# Patient Record
Sex: FEMALE | Race: WHITE | Employment: FULL TIME | URBAN - METROPOLITAN AREA
[De-identification: names, ages, dates, MRNs, and addresses within clinical notes are randomized per-mention and may not be internally consistent; named-entity substitution may affect disease eponyms.]

---

## 2019-02-01 ENCOUNTER — OFFICE VISIT (OUTPATIENT)
Dept: FAMILY MEDICINE CLINIC | Age: 39
End: 2019-02-01

## 2019-02-01 VITALS
WEIGHT: 132 LBS | SYSTOLIC BLOOD PRESSURE: 103 MMHG | HEART RATE: 59 BPM | BODY MASS INDEX: 20.72 KG/M2 | DIASTOLIC BLOOD PRESSURE: 66 MMHG | OXYGEN SATURATION: 100 % | HEIGHT: 67 IN | RESPIRATION RATE: 16 BRPM | TEMPERATURE: 98.5 F

## 2019-02-01 DIAGNOSIS — G89.29 CHRONIC LEFT-SIDED LOW BACK PAIN WITHOUT SCIATICA: Primary | ICD-10-CM

## 2019-02-01 DIAGNOSIS — M54.50 CHRONIC LEFT-SIDED LOW BACK PAIN WITHOUT SCIATICA: Primary | ICD-10-CM

## 2019-02-01 RX ORDER — DEXAMETHASONE SODIUM PHOSPHATE 4 MG/ML
4 INJECTION, SOLUTION INTRA-ARTICULAR; INTRALESIONAL; INTRAMUSCULAR; INTRAVENOUS; SOFT TISSUE ONCE
Qty: 1 ML | Refills: 0
Start: 2019-02-01 | End: 2019-02-01

## 2019-02-01 RX ORDER — LIDOCAINE HYDROCHLORIDE 10 MG/ML
2 INJECTION INFILTRATION; PERINEURAL ONCE
Qty: 2 ML | Refills: 0
Start: 2019-02-01 | End: 2019-02-01

## 2019-02-01 RX ORDER — NAPROXEN SODIUM 220 MG
220 TABLET ORAL 2 TIMES DAILY WITH MEALS
COMMUNITY
End: 2020-01-14

## 2019-02-01 NOTE — PROGRESS NOTES
Subjective:  
History: This patient is a 43 y.o. female with a chief complaint of low back pain. Left lower back pain x 3 months. No known injury or trauma. She works as a  so bending over a lot and this increases her pain. Aggravating factors: prolonged immobility followed by activity, trouble with bending Alleviating factors: some relief with laying flat Tried chiropractor, home strengthening exercises, ice, back brace, aleeve, tylenol with minimal improvement. She had radiographs done at her chiropractor and she brings the films for review. No bowel or bladder incontinence. No fever, night sweats, or weight changes. No saddle anesthesia. Review of Systems: 
General/Constitutional:  No fever, chills, sweats, fatigue, night sweats, weakness, weight loss or weight gain Head: No headache, no trauma Neck: No swelling, masses, stiffness, pain, or limited movement Cardiac: No chest pain Respiratory: No cough, shortness of breath, or dyspnea on exertion GI: No incontinence. No nausea/vomiting, diarrhea, abdominal pain, bloody or dark stools : No incontinence. No change in urinary habits. Peripheral Vascular: No edema, coldness, numbness, discoloration, pain, or paresthesias Musculoskeletal: As per HPI Neurological: No saddle distribution paresthesia. No siatic pain. No loss of consciousness, dizziness, seizures, dysarthria, cognitive changes, problems with balance, or unilateral weakness. No past medical history on file. Family History Problem Relation Age of Onset  Breast Cancer Paternal Aunt 46  
     breast cancer Current Outpatient Medications Medication Sig Dispense Refill  naproxen sodium (ALEVE) 220 mg tablet Take 220 mg by mouth two (2) times daily (with meals).  dexamethasone (DECADRON) 4 mg/mL injection 1 mL by IntraMUSCular route once for 1 dose.  0.5ml of dexamethasone with 2.5ml of lidocaine for trigger point injection in lower back 1 mL 0  
  lidocaine (XYLOCAINE) 10 mg/mL (1 %) injection 2 mL by IntraMUSCular route once for 1 dose. 0.5ml of dexamethasone with 2.5ml of lidocaine for trigger point injection in lower back 2 mL 0  
 MULTIVITAMIN PO Take  by mouth.  VITAMIN B COMPLEX (B-COMPLEX PO) Take  by mouth.  LORATADINE (CLARITIN PO) Take  by mouth. Allergies Allergen Reactions  Codeine Other (comments)  
  migraines Social History Socioeconomic History  Marital status:  Spouse name: Not on file  Number of children: Not on file  Years of education: Not on file  Highest education level: Not on file Social Needs  Financial resource strain: Not on file  Food insecurity - worry: Not on file  Food insecurity - inability: Not on file  Transportation needs - medical: Not on file  Transportation needs - non-medical: Not on file Occupational History  Not on file Tobacco Use  Smoking status: Never Smoker  Smokeless tobacco: Never Used Substance and Sexual Activity  Alcohol use: No  
 Drug use: Not on file  Sexual activity: Not on file Other Topics Concern  Not on file Social History Narrative Works cleaning houses Objective:  
 
Visit Vitals /66 Pulse (!) 59 Temp 98.5 °F (36.9 °C) (Oral) Resp 16 Ht 5' 7\" (1.702 m) Wt 132 lb (59.9 kg) SpO2 100% BMI 20.67 kg/m² General: Alert and oriented and in no acute distress. Responds to all questions appropriately. LUNGS: Respirations unlabored. Skin: No obvious rash. MSK:  
 Posture: Normal 
 Deformity: None ROM:  
  Flexion: Normal; pain in the left lower back with flexion. Extension: Normal  
  Lateral bending: Normal  
 
 Gait: Normal   
 
 Palpation: L1-L5: No tenderness Sacrum: No tenderness Coccyx: No tenderness Left Paraspinal: tenderness Right Paraspinal: No tenderness Strength (0-5/5)   Hip Flexion:   Left: 5/5  Right: 5/5 
 Hip Extension:  Left: 5/5  Right: 5/5 Hip Abduction:  Left: 5/5  Right: 5/5 Hip Adduction:  Left: 5/5  Right: 5/5 Knee Extension:  Left: 5/5  Right: 5/5 Knee Flexion:   Left: 5/5  Right: 5/5 Ankle dorsiflexion:  Left: 5/5  Right: 5/5 Ankle plantarflexion:  Left: 5/5  Right: 5/5 Great toe extension:  Left: 5/5  Right: 5/5 Sensation: Intact, no deficits DTR: 
  Patella:  Left: +2  Right: +2 Special test: 
  Straight leg: Left: Negative  Right: Negative Rubens: Left: Positive   Right: Negative Piriformis: Left: Negative  Right: Negative Imagin view lumbar spine films from outside facility personally reviewed and demonstrates no fracture, deformity. No significant degenerative changes. Procedure: 
Informed consent - Risks, benefits and alternatives discussed. Time Out - Performed, cross checking patient ID and procedure. Preparation - Cleaned and prepped with alcohol swab x3. Anesthesia - Ethyl chloride spray used to anesthitise the skin prior to injection. Description of procedure - 1 trigger point identified in the bilateral lower back in the paraspinals and injected with 0.5 ml dexamethasone and 2.5 ml of 1% lidocaine mixture under sterile conditions. Patient tolerated the procedure well and there were no complications. Patient reports pain relief following the injections. Assessment: ICD-10-CM ICD-9-CM 1. Chronic left-sided low back pain without sciatica M54.5 724.2 DEXAMETHASONE SODIUM PHOSPHATE INJECTION 1 MG  
 G89.29 338.29 dexamethasone (DECADRON) 4 mg/mL injection [place] THER/PROPH/DIAG INJECTION, SUBCUT/IM  
   LIDOCAINE INJECTION  
   lidocaine (XYLOCAINE) 10 mg/mL (1 %) injection Plan: 1. Home Exercise Program as per handout. 2. Ice 15 minutes, three times a day PRN and after exercise. Can alternate with heat for 15 minutes. 3. Trigger point injection today-orders as above. Medications: 1.  Naproxin (Aleve): 220mg 1-2 tablets twice a day PRN. 2. Acetaminophen (Tylenol):  500mg 1-2 tablets every 6 hours as needed for pain. RTC: patient has wellness exam coming up next week and will f/u of sx at that time.

## 2019-02-01 NOTE — PROGRESS NOTES
Chief Complaint Patient presents with  Back Pain  
  pt describes pain in low back that feels like sciatic pain on left side x 3 months, denies any injury seen by chiropractor for neck & back pain, no relief wirh aleve but min. relief with [facility]ICE PROCEDURE PROGRESS NOTE Chart reviewed for the following: 
 I,Dr. Shana Milligan, have reviewed the History, Physical and updated the Allergic reactions for Christine CORDOVA OUT performed immediately prior to start of procedure: 
 I, Dr. Shana Milligan, have performed the following reviews on Christine Turner prior to the start of the procedure: 
         
* Patient was identified by name and date of birth * Agreement on procedure being performed was verified * Risks and Benefits explained to the patient * Procedure site verified and marked as necessary * Patient was positioned for comfort * Consent was signed and verified Time: 2:05pm 
 
 
Date of procedure: 2/1/2019 Procedure performed by:  Richi Urrutia MD 
 
Provider assisted by: Lupis Burgess MD 
 
Patient assisted by: self How tolerated by patient: tolerated the procedure well with no complications Post Procedural Pain Scale: 2 - Hurts Little Bit Comments: none

## 2019-02-03 ENCOUNTER — TELEPHONE (OUTPATIENT)
Dept: FAMILY MEDICINE CLINIC | Age: 39
End: 2019-02-03

## 2019-02-03 NOTE — TELEPHONE ENCOUNTER
Pt called stating she  had a steroid injection in her back on Friday 2/1/19. She got some relief on Saturday 2/2/19 but today the pain is worse than it was before the injection. Offered appt today but she declined. No fever or redness at injection site. She decided to rest today and make appt for tomorrow. Appt made. Instructed her to take Aleve and use ice for the pain.

## 2019-02-04 ENCOUNTER — TELEPHONE (OUTPATIENT)
Dept: FAMILY MEDICINE CLINIC | Age: 39
End: 2019-02-04

## 2019-02-04 NOTE — TELEPHONE ENCOUNTER
Pt states she is feeling better , was in a lot of pain on yesterday. Today better and currently at work. Per Dr. Gina Cameron notify pt that he can injection SI joint if needed .  Continue exercises

## 2019-02-07 ENCOUNTER — TELEPHONE (OUTPATIENT)
Dept: FAMILY MEDICINE CLINIC | Age: 39
End: 2019-02-07

## 2019-02-07 NOTE — TELEPHONE ENCOUNTER
Pt notified Dr. Hector Navas would like to see her in clinic tomorrow.  Pt states she has appt at 2:30 for complete physical and Dr. Hector Navas will be precepting and states he will come in to see her with Dr. Kelle Mueller  ( Dr. Shauna Wolfe and [facility] aware normal...

## 2019-02-07 NOTE — TELEPHONE ENCOUNTER
----- Message from Courtney Avendano sent at 2/7/2019  8:33 AM EST -----  Regarding: Dr. Alexandria Mata  Patient is extreme back pain after coming in and receiving a steroid injection. Please call her at [phone] and advise of the next steps.

## 2019-02-08 ENCOUNTER — OFFICE VISIT (OUTPATIENT)
Dept: FAMILY MEDICINE CLINIC | Age: 39
End: 2019-02-08

## 2019-02-08 VITALS
HEART RATE: 69 BPM | BODY MASS INDEX: 20.5 KG/M2 | DIASTOLIC BLOOD PRESSURE: 71 MMHG | HEIGHT: 67 IN | WEIGHT: 130.6 LBS | OXYGEN SATURATION: 99 % | RESPIRATION RATE: 16 BRPM | TEMPERATURE: 97 F | SYSTOLIC BLOOD PRESSURE: 102 MMHG

## 2019-02-08 DIAGNOSIS — M54.50 CHRONIC LEFT-SIDED LOW BACK PAIN WITHOUT SCIATICA: ICD-10-CM

## 2019-02-08 DIAGNOSIS — G89.29 CHRONIC LEFT-SIDED LOW BACK PAIN WITHOUT SCIATICA: ICD-10-CM

## 2019-02-08 DIAGNOSIS — Z00.00 WELL ADULT EXAM: Primary | ICD-10-CM

## 2019-02-08 RX ORDER — CYCLOBENZAPRINE HCL 5 MG
5 TABLET ORAL
Qty: 15 TAB | Refills: 0 | Status: SHIPPED | OUTPATIENT
Start: 2019-02-08 | End: 2019-02-20 | Stop reason: SDUPTHER

## 2019-02-09 LAB
ALBUMIN SERPL-MCNC: 4.6 G/DL (ref 3.5–5.5)
ALBUMIN/GLOB SERPL: 1.7 {RATIO} (ref 1.2–2.2)
ALP SERPL-CCNC: 54 IU/L (ref 39–117)
ALT SERPL-CCNC: 10 IU/L (ref 0–32)
AST SERPL-CCNC: 17 IU/L (ref 0–40)
BILIRUB SERPL-MCNC: 0.2 MG/DL (ref 0–1.2)
BUN SERPL-MCNC: 15 MG/DL (ref 6–20)
BUN/CREAT SERPL: 23 (ref 9–23)
CALCIUM SERPL-MCNC: 9.3 MG/DL (ref 8.7–10.2)
CHLORIDE SERPL-SCNC: 104 MMOL/L (ref 96–106)
CHOLEST SERPL-MCNC: 158 MG/DL (ref 100–199)
CO2 SERPL-SCNC: 26 MMOL/L (ref 20–29)
CREAT SERPL-MCNC: 0.66 MG/DL (ref 0.57–1)
ERYTHROCYTE [DISTWIDTH] IN BLOOD BY AUTOMATED COUNT: 12.8 % (ref 12.3–15.4)
GLOBULIN SER CALC-MCNC: 2.7 G/DL (ref 1.5–4.5)
GLUCOSE SERPL-MCNC: 105 MG/DL (ref 65–99)
HCT VFR BLD AUTO: 37.8 % (ref 34–46.6)
HDLC SERPL-MCNC: 53 MG/DL
HGB BLD-MCNC: 12 G/DL (ref 11.1–15.9)
INTERPRETATION, 910389: NORMAL
LDLC SERPL CALC-MCNC: 92 MG/DL (ref 0–99)
MCH RBC QN AUTO: 29.1 PG (ref 26.6–33)
MCHC RBC AUTO-ENTMCNC: 31.7 G/DL (ref 31.5–35.7)
MCV RBC AUTO: 92 FL (ref 79–97)
PLATELET # BLD AUTO: 339 X10E3/UL (ref 150–379)
POTASSIUM SERPL-SCNC: 4.5 MMOL/L (ref 3.5–5.2)
PROT SERPL-MCNC: 7.3 G/DL (ref 6–8.5)
RBC # BLD AUTO: 4.13 X10E6/UL (ref 3.77–5.28)
SODIUM SERPL-SCNC: 144 MMOL/L (ref 134–144)
TRIGL SERPL-MCNC: 66 MG/DL (ref 0–149)
VLDLC SERPL CALC-MCNC: 13 MG/DL (ref 5–40)
WBC # BLD AUTO: 5.9 X10E3/UL (ref 3.4–10.8)

## 2019-02-19 ENCOUNTER — TELEPHONE (OUTPATIENT)
Dept: FAMILY MEDICINE CLINIC | Age: 39
End: 2019-02-19

## 2019-02-19 DIAGNOSIS — G89.29 CHRONIC LEFT-SIDED LOW BACK PAIN WITHOUT SCIATICA: ICD-10-CM

## 2019-02-19 DIAGNOSIS — M54.50 CHRONIC LEFT-SIDED LOW BACK PAIN WITHOUT SCIATICA: ICD-10-CM

## 2019-02-19 NOTE — TELEPHONE ENCOUNTER
Patient calling to office today with concerns of her low back pain. Patient states she doesn't feel like physical therapy should be the 1st option for her at this point. Patient states nobody knows what's wrong with her and she wants to know what's going on with her back. Patient states she feels like a MRI Should be done or some other type of imaging. She states the steroid injection that was given to her set her back as she had terrible side effects. Patient looking for more input on the matter and just feel there should be some other options.     Pt ph [phone]

## 2019-02-20 RX ORDER — CYCLOBENZAPRINE HCL 5 MG
5 TABLET ORAL
Qty: 15 TAB | Refills: 0 | Status: SHIPPED | OUTPATIENT
Start: 2019-02-20 | End: 2020-01-14

## 2019-02-20 NOTE — TELEPHONE ENCOUNTER
Called and discussed with patient. Her concern is that the PT will cause her muscle spasms to be worse, and that she's alomost out of flexeril. Had a detailed conversation regarding her symptoms, the muscular nature of this, and the general timeline for how low back pain improves. Patient reports an understanding, and is comfortable with doing PT prior to pursing advanced injury. Will refill her flexeril which she is only taking at night. She will let us know if any further issues.

## 2019-02-25 ENCOUNTER — HOSPITAL ENCOUNTER (OUTPATIENT)
Dept: PHYSICAL THERAPY | Age: 39
Discharge: HOME | End: 2019-02-25
Payer: COMMERCIAL

## 2019-02-25 PROCEDURE — 97161 PT EVAL LOW COMPLEX 20 MIN: CPT | Performed by: PHYSICAL THERAPIST

## 2019-02-25 PROCEDURE — 97110 THERAPEUTIC EXERCISES: CPT | Performed by: PHYSICAL THERAPIST

## 2019-02-25 NOTE — PROGRESS NOTES
[address] [facility] Kathy Craig 56  Phone: [phone]  Fax: [phone]    Plan of Care/ Statement of Necessity for Physical Therapy Services 2-15    Patient name: Misha Starks  : 1980  Provider#: [id number]  Referral source: Yaz Monteiro MD      Medical/Treatment Diagnosis: Low back pain [M54.5]     Prior Hospitalization: see medical history     Comorbidities: none noted  Prior Level of Function: work as , care of home and family  Medications: Verified on Patient Summary List    Start of Care: 2019      Onset Date: 10/2018       The Plan of Care and following information is based on the information from the initial evaluation. Assessment/ darden information: Blanca Booker presents to our office with back pain and limited ADL/IADL performance. She is limited in AROM lumbar spine, has some LE weakness, is unable to sit for prolonged periods, cannot perform her job as needed and is unable to sleep well without pain. She will benefit from skilled PT prior to D/C to address the below and allow return to N ADL skills and N IADL skills.     Evaluation Complexity History LOW Complexity : Zero comorbidities / personal factors that will impact the outcome / POC; Examination MEDIUM Complexity : 3 Standardized tests and measures addressing body structure, function, activity limitation and / or participation in recreation  ;Presentation LOW Complexity : Stable, uncomplicated  ;Clinical Decision Making MEDIUM Complexity : FOTO score of 26-74  Overall Complexity Rating: LOW     Problem List: pain affecting function, decrease ROM, decrease strength, decrease ADL/ functional abilitiies, decrease activity tolerance, decrease flexibility/ joint mobility and decrease transfer abilities   Treatment Plan may include any combination of the following: Therapeutic exercise, Therapeutic activities, Neuromuscular re-education, Physical agent/modality, Gait/balance training, Manual therapy, Patient education, Self Care training, Functional mobility training, Home safety training and Stair training  Patient / Family readiness to learn indicated by: asking questions, trying to perform skills and interest  Persons(s) to be included in education: patient (P)  Barriers to Learning/Limitations: None  Patient Goal (s): get off the medication and to not have pain  Patient Self Reported Health Status: good  Rehabilitation Potential: good    Short Term Goals: To be accomplished in 4-5 treatments:  1) Patient will demonstrate Negative Hruska Adduction Drop Test   2) Patient will demonstrate independence with HEP  3) Patient will demonstrate greater than Grade II on Hruska Adduction Lift Test    Long Term Goals: To be accomplished in 10-12 treatments:  1)Patient will demonstrate Grade IV or Grade V Hruska Adduction Lift Score to allow for functional mobility in home and community settings  2)Pt will demonstrate the ability to ambulate forward and backward achieving bilateral Acetabular Femoral Internal Rotation for pain free mobility  3)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 Patient/ Caregiver education and instruction: self care, activity modification and exercises    [x]  Plan of care has been reviewed with MANAN Ortega PT, DPT, [facility] 2/25/2019     ________________________________________________________________________    I certify that the above Therapy Services are being furnished while the patient is under my care. I agree with the treatment plan and certify that this therapy is necessary.     [de-identified] Signature:____________________  Date:____________Time: _________

## 2019-02-25 NOTE — PROGRESS NOTES
PT INITIAL EVALUATION NOTE 2-15    Patient Name: Kole Britt  Date:2019  : 1980  [x]  Patient  Verified  Payor: Reyes Daunt / Plan: [address] [place] [address] [place] HMO / Product Type: HMO /    In time:1:00 pm  Out time:2:00 pm  Total Treatment Time (min): 60  Visit #: 1     Treatment Area: Low back pain [M54.5]    SUBJECTIVE  Pain Level (0-10 scale): 5/10  Any medication changes, allergies to medications, adverse drug reactions, diagnosis change, or new procedure performed?: [] No    [x] Yes (see summary sheet for update)  Subjective:     Pt reports that she continues to have pain in her back despite chiropractic care, a cortisone injection (that made her symptoms worse and have spread her pain across her low back) and OTC as well as NSAIDs per script. She reports that her symptoms are not getting much better overall. She is reporting that her symptoms are worse than when she started. She reports that the pain is chronic and is more limiting to her life. She reports that the sleeping might be getting a bit better with sleeping but feels like this is largely due to the mm relaxer. She is taking a break from chiropractic care at this time. She does not know when she will see her PCP again. She has seen Dr. Wesley Avila before. She does not know who is her PCP.     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  Mechanism of Injury: insidious onset  Previous Treatment/Compliance: injection, NSAIDs, mm relaxant  PMHx/Surgical Hx: injection in low back  Work Hx: cleaning homes residentially  Living Situation: home with  and kids and stairs that are sometimes a problem  Pt Goals: pain relief and getting off of NSAIDs   Barriers: pt demonstrates some anxiety reactions and seems to be dissatisfied with previous care by referring practice  Motivation: good to excellent  Substance use: none stated   FABQ Score: see FOTO  Cognition: A & O x 4        OBJECTIVE/EXAMINATION  Posture:  Hyperextended upright posture in seated  Other Observations:  Pt is unable to sit still and shifts throughout session  Gait and Functional Mobility:  When pt bent over to  her belongings off of her chair, she hinged at her hips, kept her lumbar spine full extended and ER bilateral hip  Palpation: hypertonicity present bilateral QL and lumbar paraspinals        Lumbar AROM:  Cervical AROM:        R  L  R  L  Flexion    Hinge at hips    WNL    Extension   Limited by pn        Side Bending   Stiff in both directions        Rotation   NT  NT          LOWER QUARTER   MUSCLE STRENGTH  KEY       R  L  0 - No Contraction  L1, L2 Psoas  4  4  1 - Trace   L3 Quads  5  5  2 - Poor   L4 Tib Ant  5  5  3 - Fair    L5 EHL  5  5  4 - Good   S1 FHL  5  5  5 - Normal   S2 Hams  4  4          MMT: see myotomes  Neurological: Reflexes / Sensations: LE dermatomes WNL  Special Tests:    Trendelenberg: - bilaterally       Forward Bend: + for pn   Slump: + bilateral LE at full knee ext         15 min Therapeutic Exercise:  [x] See flow sheet :   Rationale: increase ROM, increase strength, improve coordination, improve balance and increase proprioception to improve the patients ability to sit without pain          With   [x] TE   [] TA   [] neuro   [] other: Patient Education: [x] Review HEP    [] Progressed/Changed HEP based on:   [] positioning   [] body mechanics   [] transfers   [] heat/ice application    [] other:        Other Objective/Functional Measures:see FOTO scanned into chart    Pain Level (0-10 scale) post treatment: 2/10      ASSESSMENT:      [x]  See Plan of Care      Boris Coe PT, DPT, [facility] 2/25/2019

## 2019-03-04 ENCOUNTER — HOSPITAL ENCOUNTER (OUTPATIENT)
Dept: PHYSICAL THERAPY | Age: 39
Discharge: HOME | End: 2019-03-04
Payer: COMMERCIAL

## 2019-03-04 PROCEDURE — 97112 NEUROMUSCULAR REEDUCATION: CPT | Performed by: PHYSICAL MEDICINE & REHABILITATION

## 2019-03-04 NOTE — PROGRESS NOTES
PT DAILY TREATMENT NOTE - [facility] 2-15 Patient Name: Keith Monroe Date:3/4/2019 : 1980 [x]  Patient  Verified Payor: Glynn Mcneill / Plan: [employer] HMO / Product Type: HMO /   
In time:435 pm  Out time:535 pm 
Total Treatment Time (min): 60 Total Timed Codes (min): 50 
1:1 Treatment Time ( only): - Visit #: 2 Treatment Area: Low back pain [M54.5] SUBJECTIVE Pain Level (0-10 scale): 3-4/10 Any medication changes, allergies to medications, adverse drug reactions, diagnosis change, or new procedure performed?: [x] No    [] Yes (see summary sheet for update) Subjective functional status/changes:   [] No changes reported Patient reported that she is doing a little better but isn't sure if she is doing one of the exercises correctly. OBJECTIVE Modality rationale: decrease inflammation, decrease pain and increase tissue extensibility to improve the patients ability to ADLs, work tolerance Min Type Additional Details  
 [] Estim: []Att   []Unatt        []TENS instruct []IFC  []Premod   []NMES []Other:  []w/US   []w/ice   []w/heat Position: Location:  
 []  Traction: [] Cervical       []Lumbar 
                     [] Prone          []Supine []Intermittent   []Continuous Lbs: 
[] before manual 
[] after manual 
[]w/heat  
 []  Ultrasound: []Continuous   [] Pulsed at: 
                         []1MHz   []3MHz Location: 
W/cm2:  
 [] Paraffin Location:  
[]w/heat  
10 []  Ice     [x]  Heat 
[]  Ice massage Position: supine Location: low back  
 []  Laser 
[]  Other: Position: Location:  
 
 []  Vasopneumatic Device Pressure:       [] lo [] med [] hi  
Temperature:   
 
[x] Skin assessment post-treatment:  [x]intact []redness- no adverse reaction 
  []redness  adverse reaction:  
  
50 min Neuromuscular Re-education:  [x]  See flow sheet :  
Rationale: increase ROM, increase strength, improve coordination and increase proprioception  to improve the patients ability to ADLs, reaching overhead, work tolerance With 
 [x] TE 
 [] TA 
 [] neuro 
 [] other: Patient Education: [x] Review HEP [] Progressed/Changed HEP based on:  
[] positioning   [] body mechanics   [] transfers   [] heat/ice application   
[] other:   
 
Other Objective/Functional Measures:  
HAdDT: + B 
HGIR: - B HAdLT: 1/5 B Pain Level (0-10 scale) post treatment: 0/10 ASSESSMENT/Changes in Function:  
 
Patient will continue to benefit from skilled PT services to modify and progress therapeutic interventions, address functional mobility deficits, address ROM deficits, address strength deficits, analyze and address soft tissue restrictions, analyze and cue movement patterns, analyze and modify body mechanics/ergonomics and assess and modify postural abnormalities to attain remaining goals. []  See Plan of Care 
[]  See progress note/recertification 
[]  See Discharge Summary Progress towards goals / Updated goals: 
Patient is showing good progress towards goals but continues to be limited in prolong standing and work tolerance. PLAN [x]  Upgrade activities as tolerated     [x]  Continue plan of care [x]  Update interventions per flow sheet      
[]  Discharge due to:_ 
[]  Other:_ Diamond Wilder, PTA, CPT 3/4/2019

## 2019-03-06 ENCOUNTER — HOSPITAL ENCOUNTER (OUTPATIENT)
Dept: PHYSICAL THERAPY | Age: 39
Discharge: HOME | End: 2019-03-06
Payer: COMMERCIAL

## 2019-03-06 PROCEDURE — 97112 NEUROMUSCULAR REEDUCATION: CPT | Performed by: PHYSICAL MEDICINE & REHABILITATION

## 2019-03-06 NOTE — PROGRESS NOTES
PT DAILY TREATMENT NOTE - [facility] 2-15    Patient Name: Kj Postin  Date:3/6/2019  : 1980  [x]  Patient  Verified  Payor: Paz Morrow / Plan: [employer] / Product Type: HMO /    In time:240 pm  Out time:335 pm  Total Treatment Time (min): 55  Total Timed Codes (min): 45  1:1 Treatment Time ( only): -   Visit #: 2    Treatment Area: Low back pain [M54.5]    SUBJECTIVE  Pain Level (0-10 scale): 310  Any medication changes, allergies to medications, adverse drug reactions, diagnosis change, or new procedure performed?: [x] No    [] Yes (see summary sheet for update)  Subjective functional status/changes:   [] No changes reported  Patient reported that she gets immediate relief with her HEP. \"It doesn't last that long but it's relief that I can get through the day. \" Patient stated she is having some pain from a busy day at work today.     OBJECTIVE    Modality rationale: decrease inflammation, decrease pain and increase tissue extensibility to improve the patients ability to ADLs, work tolerance   Min Type Additional Details    [] Estim: []Att   []Unatt        []TENS instruct                  []IFC  []Premod   []NMES                     []Other:  []w/US   []w/ice   []w/heat  Position:  Location:    []  Traction: [] Cervical       []Lumbar                       [] Prone          []Supine                       []Intermittent   []Continuous Lbs:  [] before manual  [] after manual  []w/heat    []  Ultrasound: []Continuous   [] Pulsed at:                           []1MHz   []3MHz Location:  W/cm2:    [] Paraffin         Location:   []w/heat   10 []  Ice     [x]  Heat  []  Ice massage Position: supine  Location: low back    []  Laser  []  Other: Position:  Location:      []  Vasopneumatic Device Pressure:       [] lo [] med [] hi   Temperature:      [x] Skin assessment post-treatment:  [x]intact []redness- no adverse reaction    []redness  adverse reaction:      45 min Neuromuscular Re-education:  [x]  See flow sheet :   Rationale: increase ROM, increase strength, improve coordination and increase proprioception  to improve the patients ability to ADLs, reaching overhead, work tolerance          With   [x] TE   [] TA   [] neuro   [] other: Patient Education: [x] Review HEP    [] Progressed/Changed HEP based on:   [] positioning   [] body mechanics   [] transfers   [] heat/ice application    [] other:      Other Objective/Functional Measures:   HAdDT: + B  HGIR: - B  HAdLT: 3/5 B     Pain Level (0-10 scale) post treatment: 0/10    ASSESSMENT/Changes in Function:     Patient will continue to benefit from skilled PT services to modify and progress therapeutic interventions, address functional mobility deficits, address ROM deficits, address strength deficits, analyze and address soft tissue restrictions, analyze and cue movement patterns, analyze and modify body mechanics/ergonomics and assess and modify postural abnormalities to attain remaining goals. []  See Plan of Care  []  See progress note/recertification  []  See Discharge Summary         Progress towards goals / Updated goals:  Patient is showing good progress towards goals with [facility] treatment but continues to be limited in prolong standing and work tolerance.     PLAN  [x]  Upgrade activities as tolerated     [x]  Continue plan of care  [x]  Update interventions per flow sheet       []  Discharge due to:_  []  Other:_      Kashif Alex PTA, CPT 3/6/2019

## 2019-03-12 ENCOUNTER — HOSPITAL ENCOUNTER (OUTPATIENT)
Dept: PHYSICAL THERAPY | Age: 39
Discharge: HOME | End: 2019-03-12
Payer: COMMERCIAL

## 2019-03-12 PROCEDURE — 97112 NEUROMUSCULAR REEDUCATION: CPT | Performed by: PHYSICAL MEDICINE & REHABILITATION

## 2019-03-12 NOTE — PROGRESS NOTES
PT DAILY TREATMENT NOTE - [facility] 2-15    Patient Name: Jennyfer First  Date:3/12/2019  : 1980  [x]  Patient  Verified  Payor: Ned Nieves / Plan: [employer] / Product Type: HMO /    In time:235 pm  Out time:330 pm  Total Treatment Time (min): 55  Total Timed Codes (min): 45  1:1 Treatment Time ( only): -   Visit #: 3    Treatment Area: Low back pain [M54.5]    SUBJECTIVE  Pain Level (0-10 scale): 4-5/10  Any medication changes, allergies to medications, adverse drug reactions, diagnosis change, or new procedure performed?: [x] No    [] Yes (see summary sheet for update)  Subjective functional status/changes:   [] No changes reported  Patient reported that she really didn't have any pain over the weekend. Patient stated today was the hardest work day in a while and had more increase in pain present.     OBJECTIVE    Modality rationale: decrease inflammation, decrease pain and increase tissue extensibility to improve the patients ability to ADLs, work tolerance   Min Type Additional Details    [] Estim: []Att   []Unatt        []TENS instruct                  []IFC  []Premod   []NMES                     []Other:  []w/US   []w/ice   []w/heat  Position:  Location:    []  Traction: [] Cervical       []Lumbar                       [] Prone          []Supine                       []Intermittent   []Continuous Lbs:  [] before manual  [] after manual  []w/heat    []  Ultrasound: []Continuous   [] Pulsed at:                           []1MHz   []3MHz Location:  W/cm2:    [] Paraffin         Location:   []w/heat   10 []  Ice     [x]  Heat  []  Ice massage Position: supine  Location: low back    []  Laser  []  Other: Position:  Location:      []  Vasopneumatic Device Pressure:       [] lo [] med [] hi   Temperature:      [x] Skin assessment post-treatment:  [x]intact []redness- no adverse reaction    []redness  adverse reaction:      45 min Neuromuscular Re-education:  [x]  See flow sheet :   Rationale: increase ROM, increase strength, improve coordination and increase proprioception  to improve the patients ability to ADLs, reaching overhead, work tolerance          With   [x] TE   [] TA   [] neuro   [] other: Patient Education: [x] Review HEP    [] Progressed/Changed HEP based on:   [] positioning   [] body mechanics   [] transfers   [] heat/ice application    [] other:      Other Objective/Functional Measures:   HAdDT: - B  HGIR: - B  HAdLT: 3/5 B   Functional Squat: 2/5    Pain Level (0-10 scale) post treatment: 0/10    ASSESSMENT/Changes in Function:     Patient will continue to benefit from skilled PT services to modify and progress therapeutic interventions, address functional mobility deficits, address ROM deficits, address strength deficits, analyze and address soft tissue restrictions, analyze and cue movement patterns, analyze and modify body mechanics/ergonomics and assess and modify postural abnormalities to attain remaining goals. []  See Plan of Care  []  See progress note/recertification  []  See Discharge Summary         Progress towards goals / Updated goals:  Patient is showing good progress towards goals with [facility] treatment but continues to be limited in prolong standing and work tolerance.     PLAN  [x]  Upgrade activities as tolerated     [x]  Continue plan of care  [x]  Update interventions per flow sheet       []  Discharge due to:_  []  Other:_      Constantin Chung PTA, CPT 3/12/2019

## 2019-03-14 ENCOUNTER — HOSPITAL ENCOUNTER (OUTPATIENT)
Dept: PHYSICAL THERAPY | Age: 39
Discharge: HOME | End: 2019-03-14
Payer: COMMERCIAL

## 2019-03-14 PROCEDURE — 97112 NEUROMUSCULAR REEDUCATION: CPT | Performed by: PHYSICAL MEDICINE & REHABILITATION

## 2019-03-14 NOTE — PROGRESS NOTES
PT DAILY TREATMENT NOTE - [facility] 2-15    Patient Name: Brayan Wong  Date:3/14/2019  : 1980  [x]  Patient  Verified  Payor: Maryan Perkins / Plan: [employer] / Product Type: HMO /    In time: 305 pm  Out time:400 pm  Total Treatment Time (min): 55  Total Timed Codes (min): 45  1:1 Treatment Time ( only): -   Visit #: 5    Treatment Area: Low back pain [M54.5]    SUBJECTIVE  Pain Level (0-10 scale): 3/10  Any medication changes, allergies to medications, adverse drug reactions, diagnosis change, or new procedure performed?: [x] No    [] Yes (see summary sheet for update)  Subjective functional status/changes:   [] No changes reported  Patient reported that she continues to have trouble with sitting for more than 5 minutes with some low back pain.     OBJECTIVE    Modality rationale: decrease inflammation, decrease pain and increase tissue extensibility to improve the patients ability to ADLs, work tolerance   Min Type Additional Details    [] Estim: []Att   []Unatt        []TENS instruct                  []IFC  []Premod   []NMES                     []Other:  []w/US   []w/ice   []w/heat  Position:  Location:    []  Traction: [] Cervical       []Lumbar                       [] Prone          []Supine                       []Intermittent   []Continuous Lbs:  [] before manual  [] after manual  []w/heat    []  Ultrasound: []Continuous   [] Pulsed at:                           []1MHz   []3MHz Location:  W/cm2:    [] Paraffin         Location:   []w/heat   10 []  Ice     [x]  Heat  []  Ice massage Position: supine  Location: low back    []  Laser  []  Other: Position:  Location:      []  Vasopneumatic Device Pressure:       [] lo [] med [] hi   Temperature:      [x] Skin assessment post-treatment:  [x]intact []redness- no adverse reaction    []redness  adverse reaction:      45 min Neuromuscular Re-education:  [x]  See flow sheet :   Rationale: increase ROM, increase strength, improve coordination and increase proprioception  to improve the patients ability to ADLs, reaching overhead, work tolerance          With   [x] TE   [] TA   [] neuro   [] other: Patient Education: [x] Review HEP    [] Progressed/Changed HEP based on:   [] positioning   [] body mechanics   [] transfers   [] heat/ice application    [] other:      Other Objective/Functional Measures:   HAdDT: - B  HGIR: - B  HAdLT: 3/5 B   Functional Squat: NT    Pain Level (0-10 scale) post treatment: 0/10    ASSESSMENT/Changes in Function:     Patient will continue to benefit from skilled PT services to modify and progress therapeutic interventions, address functional mobility deficits, address ROM deficits, address strength deficits, analyze and address soft tissue restrictions, analyze and cue movement patterns, analyze and modify body mechanics/ergonomics and assess and modify postural abnormalities to attain remaining goals. []  See Plan of Care  []  See progress note/recertification  []  See Discharge Summary         Progress towards goals / Updated goals:  Patient is showing good progress towards goals with [facility] treatment but continues to be limited in prolong standing and work tolerance.     PLAN  [x]  Upgrade activities as tolerated     [x]  Continue plan of care  [x]  Update interventions per flow sheet       []  Discharge due to:_  []  Other:_      Margot Acuna PTA, CPT 3/14/2019

## 2019-03-18 ENCOUNTER — HOSPITAL ENCOUNTER (OUTPATIENT)
Dept: PHYSICAL THERAPY | Age: 39
Discharge: HOME | End: 2019-03-18
Payer: COMMERCIAL

## 2019-03-18 PROCEDURE — 97112 NEUROMUSCULAR REEDUCATION: CPT | Performed by: PHYSICAL THERAPIST

## 2019-03-18 NOTE — PROGRESS NOTES
PT DAILY TREATMENT NOTE - [facility] 2-15    Patient Name: Abdelrahman Todd  Date:3/18/2019  : 1980  [x]  Patient  Verified  Payor: Marguerite Suero / Plan: [employer] / Product Type: HMO /    In time: 305 pm  Out time:410 pm  Total Treatment Time (min): 65  Total Timed Codes (min): 45  1:1 Treatment Time ( only): -   Visit #: 6    Treatment Area: Low back pain [M54.5]    SUBJECTIVE  Pain Level (0-10 scale): 7/10  Any medication changes, allergies to medications, adverse drug reactions, diagnosis change, or new procedure performed?: [x] No    [] Yes (see summary sheet for update)  Subjective functional status/changes:   [] No changes reported  Patient wonders if she is doing her HEP correctly as she is having a lot of trouble with her back and tightness in her pelvis.     OBJECTIVE    Modality rationale: decrease inflammation, decrease pain and increase tissue extensibility to improve the patients ability to ADLs, work tolerance   Type Additional Details   [] Estim: []Att   []Unatt        []TENS instruct                  []IFC  []Premod   []NMES                     []Other:  []w/US   []w/ice   []w/heat  Position:  Location:   []  Traction: [] Cervical       []Lumbar                       [] Prone          []Supine                       []Intermittent   []Continuous Lbs:  [] before manual  [] after manual  []w/heat   []  Ultrasound: []Continuous   [] Pulsed at:                           []1MHz   []3MHz Location:  W/cm2:   [] Paraffin         Location:   []w/heat   []  Ice     [x]  Heat  []  Ice massage Position: supine  Location: low back   []  Laser  []  Other: Position:  Location:   []  Vasopneumatic Device Pressure:       [] lo [] med [] hi   Temperature:      [x] Skin assessment post-treatment:  [x]intact []redness- no adverse reaction    []redness  adverse reaction:      45 min Neuromuscular Re-education:  [x]  See flow sheet :   Rationale: increase ROM, increase strength, improve coordination and increase proprioception  to improve the patients ability to ADLs, reaching overhead, work tolerance          With   [x] TE   [] TA   [] neuro   [] other: Patient Education: [x] Review HEP    [] Progressed/Changed HEP based on:   [] positioning   [] body mechanics   [] transfers   [] heat/ice application    [] other:      Other Objective/Functional Measures:   HAdDT: + B  HGIR: + R  HAdLT: 3/5 L, 2+/5 R   Functional Squat: NT    Pain Level (0-10 scale) post treatment: 0/10    ASSESSMENT/Changes in Function:     Patient will continue to benefit from skilled PT services to modify and progress therapeutic interventions, address functional mobility deficits, address ROM deficits, address strength deficits, analyze and address soft tissue restrictions, analyze and cue movement patterns, analyze and modify body mechanics/ergonomics and assess and modify postural abnormalities to attain remaining goals. []  See Plan of Care  []  See progress note/recertification  []  See Discharge Summary         Progress towards goals / Updated goals:  Patient is showing poor ability to maintain and recognize good ZOA and reduction of lumbar and thoracic extension patterns.     PLAN  [x]  Upgrade activities as tolerated     [x]  Continue plan of care  [x]  Update interventions per flow sheet       []  Discharge due to:_  [x]  Other:_ cont at 2x/wk for 2 more wk then down to 1x/wk      Terrence Jain, PT, DPT, [facility] 3/18/2019

## 2019-03-20 ENCOUNTER — HOSPITAL ENCOUNTER (OUTPATIENT)
Dept: PHYSICAL THERAPY | Age: 39
Discharge: HOME | End: 2019-03-20
Payer: COMMERCIAL

## 2019-03-20 PROCEDURE — 97014 ELECTRIC STIMULATION THERAPY: CPT | Performed by: PHYSICAL THERAPIST

## 2019-03-20 PROCEDURE — 97112 NEUROMUSCULAR REEDUCATION: CPT | Performed by: PHYSICAL THERAPIST

## 2019-03-20 NOTE — PROGRESS NOTES
PT DAILY TREATMENT NOTE - [facility] 2-15 Patient Name: Candia Gosselin Date:3/20/2019 : 1980 [x]  Patient  Verified Payor: Jaime Garcia / Plan: [employer] HMO / Product Type: HMO /   
In time: 2:05 pm  Out time: 3:10 pm 
Total Treatment Time (min): 65 Total Timed Codes (min): 45 
1:1 Treatment Time ([address] only): - Visit #: 9 Treatment Area: Low back pain [M54.5] SUBJECTIVE Pain Level (0-10 scale): 3/10 Any medication changes, allergies to medications, adverse drug reactions, diagnosis change, or new procedure performed?: [x] No    [] Yes (see summary sheet for update) Subjective functional status/changes:   [] No changes reported Pt is labile given her current condition and how much she will have to adjust how she stands and moves as these are causative factors of her symptoms. OBJECTIVE Modality rationale: decrease inflammation, decrease pain and increase tissue extensibility to improve the patients ability to ADLs, work tolerance Type Additional Details [x] Estim: []Att   [x]Unatt        []TENS instruct []IFC  [x]Premod   []NMES []Other:  []w/US   []w/ice   [x]w/heat Position: supine Location: lumbar spine  
[]  Traction: [] Cervical       []Lumbar 
                     [] Prone          []Supine []Intermittent   []Continuous Lbs: 
[] before manual 
[] after manual 
[]w/heat  
[]  Ultrasound: []Continuous   [] Pulsed at: 
                         []1MHz   []3MHz Location: 
W/cm2:  
[] Paraffin Location:  
[]w/heat  
[]  Ice     [x]  Heat 
[]  Ice massage Position: supine Location: low back  
[]  Laser 
[]  Other: Position: Location:  
[]  Vasopneumatic Device Pressure:       [] lo [] med [] hi  
Temperature:   
 
[x] Skin assessment post-treatment:  [x]intact [x]redness- no adverse reaction 
  []redness  adverse reaction:  
  
45 min Neuromuscular Re-education:  [x]  See flow sheet :  
 Rationale: increase ROM, increase strength, improve coordination and increase proprioception  to improve the patients ability to ADLs, reaching overhead, work tolerance With 
 [x] TE 
 [] TA 
 [] neuro 
 [] other: Patient Education: [x] Review HEP [] Progressed/Changed HEP based on:  
[] positioning   [] body mechanics   [] transfers   [] heat/ice application   
[] other:   
 
Other Objective/Functional Measures:  
HAdDT: + B 
HGIR: + R 
HAdLT: 3/5 L, 2+/5 R Pain Level (0-10 scale) post treatment: 0/10 ASSESSMENT/Changes in Function:  
 
Deedee Pierson continues to stand, sit and move with a poor ZOA and limited awareness of her body patterning. She demonstrated excellent peformance today and has improved in her ability to  L AFIR with no lumbar hyperextension present Patient will continue to benefit from skilled PT services to modify and progress therapeutic interventions, address functional mobility deficits, address ROM deficits, address strength deficits, analyze and address soft tissue restrictions, analyze and cue movement patterns, analyze and modify body mechanics/ergonomics and assess and modify postural abnormalities to attain remaining goals. []  See Plan of Care 
[]  See progress note/recertification 
[]  See Discharge Summary Progress towards goals / Updated goals: 
Patient was compliant with HEP since last visit PLAN [x]  Upgrade activities as tolerated     [x]  Continue plan of care [x]  Update interventions per flow sheet      
[]  Discharge due to:_ 
[x]  Other:_ cont at 2x/wk for 2 more wk then down to 1x/wk Dago Joseph, PT, DPT, [facility] 3/20/2019

## 2019-03-25 ENCOUNTER — APPOINTMENT (OUTPATIENT)
Dept: PHYSICAL THERAPY | Age: 39
End: 2019-03-25
Payer: COMMERCIAL

## 2019-03-27 ENCOUNTER — HOSPITAL ENCOUNTER (OUTPATIENT)
Dept: PHYSICAL THERAPY | Age: 39
Discharge: HOME | End: 2019-03-27
Payer: COMMERCIAL

## 2019-03-27 PROCEDURE — 97112 NEUROMUSCULAR REEDUCATION: CPT | Performed by: PHYSICAL THERAPIST

## 2019-03-27 NOTE — PROGRESS NOTES
[facility] [employer] [facility] [address], [address] Kathy Sung Phone: [phone]  Fax: [phone] Progress Note Patient name: Ritesh Ascencio  : 1980  Provider#: [id number] Referral source: Julia Riggs MD     
Medical/Treatment Diagnosis: Low back pain [M54.5] Prior Hospitalization: see medical history Comorbidities: none noted Prior Level of Function: work as , care of home and family Medications: Verified on Patient Summary List 
 
Start of Care: 2019      Visits since start of care: 8 Assessment/ darden information: Rishabh Cruz presents to our office with back pain and limited ADL/IADL performance. She is limited in AROM lumbar spine, has some LE weakness, is unable to sit for prolonged periods, cannot perform her job as needed and is unable to sleep well without pain. She will benefit from skilled PT prior to D/C to address the below and allow return to N ADL skills and N IADL skills. Evaluation Complexity History LOW Complexity : Zero comorbidities / personal factors that will impact the outcome / POC; Examination MEDIUM Complexity : 3 Standardized tests and measures addressing body structure, function, activity limitation and / or participation in recreation  ;Presentation LOW Complexity : Stable, uncomplicated  ;Clinical Decision Making MEDIUM Complexity : FOTO score of 26-74 Overall Complexity Rating: LOW Problem List: pain affecting function, decrease ROM, decrease strength, decrease ADL/ functional abilitiies, decrease activity tolerance, decrease flexibility/ joint mobility and decrease transfer abilities Treatment Plan may include any combination of the following: Therapeutic exercise, Therapeutic activities, Neuromuscular re-education, Physical agent/modality, Gait/balance training, Manual therapy, Patient education, Self Care training, Functional mobility training, Home safety training and Stair training Patient / Family readiness to learn indicated by: asking questions, trying to perform skills and interest 
Persons(s) to be included in education: patient (P) Barriers to Learning/Limitations: None Patient Goal (s): get off the medication and to not have pain Patient Self Reported Health Status: good Rehabilitation Potential: good Short Term Goals: To be accomplished in 4-5 treatments: 
1) Patient will demonstrate Negative Hruska Adduction Drop Test met 2) Patient will demonstrate independence with HEP met 3) Patient will demonstrate greater than Grade II on Hruska Adduction Lift Test met Long Term Goals: To be accomplished in 10-12 treatments: 
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 Patient/ Caregiver education and instruction: self care, activity modification and exercises 
 
[x]  Plan of care has been reviewed with MANAN Lancaster, PT, DPT, [facility] 3/27/2019  
 
________________________________________________________________________ I certify that the above Therapy Services are being furnished while the patient is under my care. I agree with the treatment plan and certify that this therapy is necessary. [de-identified] Signature:____________________  Date:____________Time: _________

## 2019-03-27 NOTE — PROGRESS NOTES
PT DAILY TREATMENT NOTE - [facility] 2-15 Patient Name: Misha Starks Date:3/27/2019 : 1980 [x]  Patient  Verified Payor: Catie Wilson / Plan: [employer] HMO / Product Type: HMO /   
In time: 2:50 pm  Out time: 3:50 pm 
Total Treatment Time (min): 55 Total Timed Codes (min): 55 
1:1 Treatment Time ( [address] only): - Visit #: 0 Treatment Area: Low back pain [M54.5] SUBJECTIVE Pain Level (0-10 scale): 2/10 Any medication changes, allergies to medications, adverse drug reactions, diagnosis change, or new procedure performed?: [x] No    [] Yes (see summary sheet for update) Subjective functional status/changes:   [] No changes reported Pt reports that she no longer has L lateral LE pn, is able to move better and isn't having a lot of back pain other than this one spot in her L low back. Despite subjective improvement she reports that she wants a guarantee that this is going to work. When reminded that this cannot be guaranteed, however the approach is supported by objective testing, pt stated that she knows it cannot be guaranteed, she just wants it to be. OBJECTIVE 55 min Neuromuscular Re-education:  [x]  See flow sheet :  
Rationale: increase ROM, increase strength, improve coordination and increase proprioception  to improve the patients ability to work and sit without pain With 
 [x] TE 
 [] TA 
 [] neuro 
 [] other: Patient Education: [x] Review HEP [] Progressed/Changed HEP based on:  
[] positioning   [] body mechanics   [] transfers   [] heat/ice application   
[] other:   
 
Other Objective/Functional Measures:  
HAdDT: + L 
HGIR: - bilaterally HAdLT: 3+/5 L, 3/5 R Limited L apical expansion Following intervention - PADT bilaterally Pain Level (0-10 scale) post treatment: 0/10 ASSESSMENT/Changes in Function:  
 
Blanca Booker is demonstrating improved sitting and standing postures, however she has not improved on bending and reaching and forward bending to retrieve an item on a lower shelf or chair. Patient will continue to benefit from skilled PT services to modify and progress therapeutic interventions, address functional mobility deficits, address ROM deficits, address strength deficits, analyze and address soft tissue restrictions, analyze and cue movement patterns, analyze and modify body mechanics/ergonomics and assess and modify postural abnormalities to attain remaining goals. []  See Plan of Care [x]  See progress note/recertification 
[]  See Discharge Summary Progress towards goals / Updated goals: 
See progress note PLAN [x]  Upgrade activities as tolerated     [x]  Continue plan of care [x]  Update interventions per flow sheet      
[]  Discharge due to:_ 
[x]  Other:_ [employer] auth to be submitted today. Suzan Michael, PT, DPT, [facility] 3/27/2019

## 2019-03-29 ENCOUNTER — APPOINTMENT (OUTPATIENT)
Dept: PHYSICAL THERAPY | Age: 39
End: 2019-03-29
Payer: COMMERCIAL

## 2019-04-03 ENCOUNTER — HOSPITAL ENCOUNTER (OUTPATIENT)
Dept: PHYSICAL THERAPY | Age: 39
Discharge: HOME | End: 2019-04-03
Payer: COMMERCIAL

## 2019-04-03 PROCEDURE — 97112 NEUROMUSCULAR REEDUCATION: CPT | Performed by: PHYSICAL MEDICINE & REHABILITATION

## 2019-04-03 NOTE — PROGRESS NOTES
PT DAILY TREATMENT NOTE - [facility] 2-15    Patient Name: Lauren Mackay  Date:4/3/2019  : 1980  [x]  Patient  Verified  Payor: Varsha Garrett / Plan: [employer] / Product Type: HMO /    In time: 2:30 pm  Out time: 3:40 pm  Total Treatment Time (min): 70  Total Timed Codes (min): 55  1:1 Treatment Time ( only): -   Visit #: 9    Treatment Area: Low back pain [M54.5]    SUBJECTIVE  Pain Level (0-10 scale): 1/10  Any medication changes, allergies to medications, adverse drug reactions, diagnosis change, or new procedure performed?: [x] No    [] Yes (see summary sheet for update)  Subjective functional status/changes:   [] No changes reported  Pt reports that she only had a slight increase in back pain with work (2/10) today. Patient stated she is doing better overall but still has trouble with prolong sitting >15 minutes. OBJECTIVE   MHP to low back in 90/90 for 15 minutes to promote lumbar relaxation to decrease pain. 55 min Neuromuscular Re-education:  [x]  See flow sheet :   Rationale: increase ROM, increase strength, improve coordination and increase proprioception  to improve the patients ability to work and sit without pain          With   [x] TE   [] TA   [] neuro   [] other: Patient Education: [x] Review HEP    [] Progressed/Changed HEP based on:   [] positioning   [] body mechanics   [] transfers   [] heat/ice application    [] other:      Other Objective/Functional Measures:   HAdDT: + B  HGIR: - bilaterally  HAdLT: 3+/5 L, 3/5 R   Limited L apical expansion  Following intervention - PADT bilaterally    Mod difficulty with balloon breathing today but overall good tolerance. Reviewed SHANNA positioning and squatting for work positioning.     Pain Level (0-10 scale) post treatment: 0/10    ASSESSMENT/Changes in Function:     Patient will continue to benefit from skilled PT services to modify and progress therapeutic interventions, address functional mobility deficits, address ROM deficits, address strength deficits, analyze and address soft tissue restrictions, analyze and cue movement patterns, analyze and modify body mechanics/ergonomics and assess and modify postural abnormalities to attain remaining goals. []  See Plan of Care  []  See progress note/recertification  []  See Discharge Summary         Progress towards goals / Updated goals:  Continued moderate verbals cues needed for proper form.      PLAN  [x]  Upgrade activities as tolerated     [x]  Continue plan of care  [x]  Update interventions per flow sheet       []  Discharge due to:_  []  Other:    Jenny Verma PTA, CPT 4/3/2019

## 2019-04-10 ENCOUNTER — HOSPITAL ENCOUNTER (OUTPATIENT)
Dept: PHYSICAL THERAPY | Age: 39
Discharge: HOME | End: 2019-04-10
Payer: COMMERCIAL

## 2019-04-10 PROCEDURE — 97014 ELECTRIC STIMULATION THERAPY: CPT | Performed by: PHYSICAL THERAPIST

## 2019-04-10 PROCEDURE — 97112 NEUROMUSCULAR REEDUCATION: CPT | Performed by: PHYSICAL THERAPIST

## 2019-04-10 NOTE — PROGRESS NOTES
PT DAILY TREATMENT NOTE - [facility] 2-15 Patient Name: Adilia Strauss Date:4/10/2019 : 1980 [x]  Patient  Verified Payor: Alecia Munoz / Plan: Blaine Moon HMO / Product Type: HMO /   
In time: 2:00 pm  Out time: 3:10 pm 
Total Treatment Time (min): 70 Total Timed Codes (min): 55 
1:1 Treatment Time ([address] only): - Visit #: 8 Treatment Area: Low back pain [M54.5] SUBJECTIVE Pain Level (0-10 scale): 10 Any medication changes, allergies to medications, adverse drug reactions, diagnosis change, or new procedure performed?: [x] No    [] Yes (see summary sheet for update) Subjective functional status/changes:   [] No changes reported Pt reports that she is better but still does not feel that she is there. She reports that sitting for long times is still a problem. OBJECTIVE  
MHP to low back in 90/90 for 15 minutes to promote lumbar relaxation to decrease pain. 45 min Neuromuscular Re-education:  [x]  See flow sheet :  
Rationale: increase ROM, increase strength, improve coordination and increase proprioception  to improve the patients ability to work and sit without pain With 
 [x] TE 
 [] TA 
 [] neuro 
 [] other: Patient Education: [x] Review HEP [] Progressed/Changed HEP based on:  
[] positioning   [] body mechanics   [] transfers   [] heat/ice application   
[] other:   
 
Other Objective/Functional Measures:  
HAdDT: - bilaterally HGIR: - bilaterally HAdLT: 3+/5 L, 3+/5 R Limited bilateral apical expansion Pain Level (0-10 scale) post treatment: 0/10 ASSESSMENT/Changes in Function:  
 
Patient will continue to benefit from skilled PT services to modify and progress therapeutic interventions, address functional mobility deficits, address ROM deficits, address strength deficits, analyze and address soft tissue restrictions, analyze and cue movement patterns, analyze and modify body mechanics/ergonomics and assess and modify postural abnormalities to attain remaining goals. []  See Plan of Care 
[]  See progress note/recertification 
[]  See Discharge Summary Progress towards goals / Updated goals: All STG reached. Excellent postural tolerance achieved with no v.c. needed PLAN [x]  Upgrade activities as tolerated     [x]  Continue plan of care [x]  Update interventions per flow sheet      
[]  Discharge due to:_ 
[]  Other: 
 
Jg aSnford, PT, DPT, [facility] 4/10/2019

## 2019-04-18 ENCOUNTER — HOSPITAL ENCOUNTER (OUTPATIENT)
Dept: PHYSICAL THERAPY | Age: 39
Discharge: HOME | End: 2019-04-18
Payer: COMMERCIAL

## 2019-04-18 PROCEDURE — 97112 NEUROMUSCULAR REEDUCATION: CPT | Performed by: PHYSICAL MEDICINE & REHABILITATION

## 2019-04-18 PROCEDURE — 97110 THERAPEUTIC EXERCISES: CPT | Performed by: PHYSICAL MEDICINE & REHABILITATION

## 2019-04-18 NOTE — PROGRESS NOTES
PT DAILY TREATMENT NOTE - [facility] 2-15 Patient Name: Félix Porras Date:2019 : 1980 [x]  Patient  Verified Payor: Julius Colmenares / Plan: Vaughn Oar HMO / Product Type: HMO /   
In time: 2:05 pm  Out time: 3:15 pm 
Total Treatment Time (min): 70 Total Timed Codes (min): 55 
1:1 Treatment Time ( [address] only): - Visit #: 35 Treatment Area: Low back pain [M54.5] SUBJECTIVE Pain Level (0-10 scale): 10 Any medication changes, allergies to medications, adverse drug reactions, diagnosis change, or new procedure performed?: [x] No    [] Yes (see summary sheet for update) Subjective functional status/changes:   [] No changes reported Pt reports that she sees she has made progress with no radiating pains down the leg and the pain isn't constant. She states that she is just tired and fatigued with work and feels like her body isn't strong enough to handle work like it used to. The low back pain doesn't start to increase until ~1 pm and stays until she does her HEP at night due to being very busy throughout the day. OBJECTIVE  
MHP to low back in 90/90 for 15 minutes to promote lumbar relaxation to decrease pain. 45 min Neuromuscular Re-education:  [x]  See flow sheet :  
Rationale: increase ROM, increase strength, improve coordination and increase proprioception  to improve the patients ability to work and sit without pain 10 min Therapeutic Exercise:  [x]  See flow sheet :  
Rationale: increase ROM, increase strength, improve coordination and increase proprioception  to improve the patients ability to work and sit without pain With 
 [x] TE 
 [] TA 
 [] neuro 
 [] other: Patient Education: [x] Review HEP [] Progressed/Changed HEP based on:  
[] positioning   [] body mechanics   [] transfers   [] heat/ice application   
[] other:   
 
Other Objective/Functional Measures:  
HAdDT: - bilaterally HGIR: - bilaterally HAdLT: 3+/5 L, 3+/5 R Limited bilateral apical expansion Added LE strengthening to decrease muscle fatigue with work. Pain Level (0-10 scale) post treatment: 0/10 ASSESSMENT/Changes in Function:  
 
Patient will continue to benefit from skilled PT services to modify and progress therapeutic interventions, address functional mobility deficits, address ROM deficits, address strength deficits, analyze and address soft tissue restrictions, analyze and cue movement patterns, analyze and modify body mechanics/ergonomics and assess and modify postural abnormalities to attain remaining goals. []  See Plan of Care 
[]  See progress note/recertification 
[]  See Discharge Summary Progress towards goals / Updated goals: 
Patient demonstrated good form with strength exercises with abdominal activation with mod vcs. PLAN [x]  Upgrade activities as tolerated     [x]  Continue plan of care [x]  Update interventions per flow sheet      
[]  Discharge due to:_ 
[]  Other: 
 
Vidhya Siddiqi PTA, CPT 4/18/2019

## 2019-04-24 ENCOUNTER — HOSPITAL ENCOUNTER (OUTPATIENT)
Dept: PHYSICAL THERAPY | Age: 39
Discharge: HOME | End: 2019-04-24
Payer: COMMERCIAL

## 2019-04-24 PROCEDURE — 97530 THERAPEUTIC ACTIVITIES: CPT | Performed by: PHYSICAL THERAPIST

## 2019-04-24 NOTE — PROGRESS NOTES
PT DAILY TREATMENT NOTE - [facility] 2-15 Patient Name: Sid Matthew Date:2019 : 1980 [x]  Patient  Verified Payor: Gorge Bain / Plan: Antolin Treviño HMO / Product Type: HMO /   
In time: 2:05 pm  Out time: 2:35 pm 
Total Treatment Time (min): 30 Total Timed Codes (min): 30 
1:1 Treatment Time ([facility] only): - Visit #: 84 Treatment Area: Low back pain [M54.5] SUBJECTIVE Pain Level (0-10 scale): 4/10 Any medication changes, allergies to medications, adverse drug reactions, diagnosis change, or new procedure performed?: [x] No    [] Yes (see summary sheet for update) Subjective functional status/changes:   [] No changes reported Pt reports that she believes there is something else going on. Extensive conversation regarding this continued throughout the session. OBJECTIVE 30 min Therapeutic Activities:  Discussion of POC and how to move forward currently. Pt would benefit from further visits with MD going forward given current symptoms and presence of anxiety. Rationale: increase ROM, increase strength, improve coordination and increase proprioception  to improve the patients ability to work and sit without pain With 
 [x] TE 
 [] TA 
 [] neuro 
 [] other: Patient Education: [x] Review HEP [] Progressed/Changed HEP based on:  
[] positioning   [] body mechanics   [] transfers   [] heat/ice application   
[] other:   
 
Other Objective/Functional Measures:  
HAdDT: + bilaterally HGIR: - bilaterally HAdLT: 3/5 L, 3/5 R Limited bilateral apical expansion Pain Level (0-10 scale) post treatment: 0/10 ASSESSMENT/Changes in Function:  
 
Silvana Rosales continues to present with objective findings that can be responsible for her pain. She may be having difficulty integrating SHANNA concepts into her ADL skills and work skills.   
 
Patient will continue to benefit from skilled PT services to modify and progress therapeutic interventions, address functional mobility deficits, address ROM deficits, address strength deficits, analyze and address soft tissue restrictions, analyze and cue movement patterns, analyze and modify body mechanics/ergonomics and assess and modify postural abnormalities to attain remaining goals. []  See Plan of Care [x]  See progress note/recertification 
[]  See Discharge Summary Progress towards goals / Updated goals: 
See progress note. PLAN 
[]  Upgrade activities as tolerated     []  Continue plan of care 
[]  Update interventions per flow sheet      
[]  Discharge due to:_ 
[x]  Other: pt on hold pending MD visits upcoming Justo Farr PT, DPT, [facility] 4/24/2019

## 2019-04-24 NOTE — PROGRESS NOTES
[facility] [address], [address] Phone: [phone]  Fax: [phone] Progress Note Patient name: Glenis Angelo  : 1980  Provider#: [id number] Referral source: Suzanne Estrada MD     
Medical/Treatment Diagnosis: Low back pain [M54.5] Prior Hospitalization: see medical history Comorbidities: none noted Prior Level of Function: work as , care of home and family Medications: Verified on Patient Summary List 
 
Start of Care: 2019      Visits since start of care: 12  
   
 
Assessment/ key information: Armen Ge has completed 12 visits of PT intervention with modalities PRN, postural education in sitting and standing, work ergonomic education and [facility] treatment approach intervention. She was improving in objective measurements and functional mobility prior to today's visit where she reported increased pain, in her back, difficulty with functional activties and a concern that her implanted Mirena IUD might be a potential causative factor for her current pain. She has been provided a HEP to continue and this time and has been placed on hold with PT until she follows up with Dr. Zena Dyer. Shonda Dudley and with her Ob/gyn Problem List: pain affecting function, decrease ROM, decrease strength, decrease ADL/ functional abilitiies, decrease activity tolerance, decrease flexibility/ joint mobility and decrease transfer abilities Treatment Plan may include any combination of the following: Therapeutic exercise, Therapeutic activities, Neuromuscular re-education, Physical agent/modality, Gait/balance training, Manual therapy, Patient education, Self Care training, Functional mobility training, Home safety training and Stair training Patient / Family readiness to learn indicated by: asking questions, trying to perform skills and interest 
 
 
Short Term Goals: To be accomplished in 4-5 treatments: 1) Patient will demonstrate Negative Hruska Adduction Drop Test met 2) Patient will demonstrate independence with HEP met 3) Patient will demonstrate greater than Grade II on Hruska Adduction Lift Test met Long Term Goals: To be accomplished in 10-12 treatments: 
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 Visit frequency and duration to be determined following. Patient/ Caregiver education and instruction: self care, activity modification and exercises 
 
[x]  Plan of care has been reviewed with MANAN Carter, PT, DPT, [facility] 4/24/2019  
 
________________________________________________________________________ I certify that the above Therapy Services are being furnished while the patient is under my care. I agree with the treatment plan and certify that this therapy is necessary. [de-identified] Signature:____________________  Date:____________Time: _________

## 2019-05-02 ENCOUNTER — APPOINTMENT (OUTPATIENT)
Dept: PHYSICAL THERAPY | Age: 39
End: 2019-05-02

## 2019-05-29 NOTE — ANCILLARY DISCHARGE INSTRUCTIONS
[facility] Physical Therapy [facility] [place] Phone: [phone]  Fax: [phone] Discharge Summary Patient name: Hong Pereira  : 1980  Provider#: [id number] Referral source: Oral Eubanks MD     
Medical/Treatment Diagnosis: Low back pain [M54.5] Prior Hospitalization: see medical history Comorbidities: none noted Prior Level of Function: work as , care of home and family Medications: Verified on Patient Summary List 
 
Start of Care: 2019      Visits since start of care: 12  
   
 
Assessment/ key information: Miquel Seo has completed 12 visits of PT intervention with modalities PRN, postural education in sitting and standing, work ergonomic education and [facility] treatment approach intervention. She was improving in objective measurements and functional mobility prior to today's visit where she reported increased pain, in her back, difficulty with functional activties and a concern that her implanted Mirena IUD might be a potential causative factor for her current pain. She has been provided a HEP to continue and this time and has been placed on hold with PT until she follows up with Dr. Nanci Hanson. Chris Cloud and with her Ob/gyn Problem List: pain affecting function, decrease ROM, decrease strength, decrease ADL/ functional abilitiies, decrease activity tolerance, decrease flexibility/ joint mobility and decrease transfer abilities Treatment Plan may include any combination of the following: Therapeutic exercise, Therapeutic activities, Neuromuscular re-education, Physical agent/modality, Gait/balance training, Manual therapy, Patient education, Self Care training, Functional mobility training, Home safety training and Stair training Patient / Family readiness to learn indicated by: asking questions, trying to perform skills and interest 
 
 
Short Term Goals: To be accomplished in 4-5 treatments: 1) Patient will demonstrate Negative Hruska Adduction Drop Test met 2) Patient will demonstrate independence with HEP met 3) Patient will demonstrate greater than Grade II on Hruska Adduction Lift Test met Long Term Goals: To be accomplished in 10-12 treatments: 
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 Visit frequency and duration to be determined following. Pt did not return for continued treatment. D/C at this time. Mortimer Elders, PT, DPT, [facility] 5/29/2019

## 2020-01-14 ENCOUNTER — OFFICE VISIT (OUTPATIENT)
Dept: FAMILY MEDICINE CLINIC | Age: 40
End: 2020-01-14

## 2020-01-14 VITALS
DIASTOLIC BLOOD PRESSURE: 58 MMHG | HEART RATE: 79 BPM | BODY MASS INDEX: 20.56 KG/M2 | SYSTOLIC BLOOD PRESSURE: 97 MMHG | OXYGEN SATURATION: 99 % | RESPIRATION RATE: 17 BRPM | WEIGHT: 131 LBS | HEIGHT: 67 IN | TEMPERATURE: 98 F

## 2020-01-14 DIAGNOSIS — G89.29 CHRONIC PAIN OF LEFT KNEE: Primary | ICD-10-CM

## 2020-01-14 DIAGNOSIS — M25.562 CHRONIC PAIN OF LEFT KNEE: Primary | ICD-10-CM

## 2020-01-14 RX ORDER — METHYLPREDNISOLONE 4 MG/1
TABLET ORAL
Qty: 1 DOSE PACK | Refills: 0 | Status: SHIPPED | OUTPATIENT
Start: 2020-01-14 | End: 2020-02-18 | Stop reason: ALTCHOICE

## 2020-01-14 NOTE — PROGRESS NOTES
HPI:    Julio Cesar Elmore is a 35 y.o. female who presents with left  knee pain. Ongoing since 10/2019 (4 months now). Denies any trauma. Seems to be worsening. In general pt has aches and pains during winter but this level of pain is constant. When pt sits still or lays down pain is much worse and knee starts to stiffen up. Dull ache around but worse pain around posterior knee. Feels like \" a tight band was placed on her knee and then let go\". Describes it as pressure. Feels as if leg is filling with pressure. Pt has tried Aleve (2 doses in a day) & Advil- neither helped with the pain so pt stopped taking it. The other thing that she     No past medical history on file. Current Outpatient Medications:     methylPREDNISolone (MEDROL DOSEPACK) 4 mg tablet, Take as directed on package, Disp: 1 Dose Pack, Rfl: 0    VITAMIN B COMPLEX (B-COMPLEX PO), Take  by mouth., Disp: , Rfl:   Allergies   Allergen Reactions    Codeine Other (comments)     migraines     No past medical history on file. Family History   Problem Relation Age of Onset    Breast Cancer Paternal Aunt 48        breast cancer       ROS: As per HPI otherwise negative. Objective:   Visit Vitals  BP 97/58   Pulse 79   Temp 98 °F (36.7 °C) (Oral)   Resp 17   Ht 5' 7\" (1.702 m)   Wt 131 lb (59.4 kg)   SpO2 99%   BMI 20.52 kg/m²     Gen: Well appearing. No apparent distress. Alert and oriented. Responds to all questions appropriately. Lungs: No labored respirations. Talking in complete sentences without difficulty.     Musculoskeletal:    Knee: left  Knee Effusion: None  Quadriceps atrophy: None   Popliteal (Bakers) Cyst: Negative   Patellofemoral crepitus: Negative  Tender to palpate lateral aspect of proximal fibula     ROM:  Flexion: 130  Extension: 0   Hip IR/ER: FROM without pain    Alignment:  Foot:   Patellar tracking:     Dynamic Test:  Gait: Normal   Assistive devices: None    Palpation:   Patella tenderness: None  Patellar tendon tenderness: None  Quad tendon tenderness: None  Medial joint line tenderness: None  Lateral joint line tenderness: None  MCL tenderness: None  LCL tenderness: None  Tibia tubercle tenderness: None  Proximal fibula tenderness: None  Plica tenderness: None  Prepatellar bursa tenderness: None  Pes bursa tenderness: None  ITB tenderness: None    Ligament/Meniscal Exam:  Patellar Grind: Negative   Patellar apprehension (medial/lateral): Negative   Lochman: Negative, with good endpoint   Anterior Drawer: Negative   Posterior Drawer: Negative   Valgus stress: Negative with good endpoint   Varus stress: Negative with good endpoint   Dial test: Negative   Alverto: Negative   Brianna sign: Negative. Strength (0-5/5):   Flexion: Left: 5/5    Right: 5/5    Extension: Left: 5/5    Right: 5/5    Hip abduction: 5/5    Hip adduction: 5/5      Neuro/Vascular : Pulses intact, no edema, and neurologically intact . Skin: No obvious rash or skin breakdown. ASSESSMENT:    ICD-10-CM ICD-9-CM    1. Chronic left-sided low back pain without sciatica M54.5 724.2     G89.29 338.29    2. Chronic pain of left knee M25.562 719.46 XR KNEE LT MIN 4 V    G89.29 338.29 REFERRAL TO NEUROLOGY       PLAN:    Chronic pain of left knee: Ddx include peroneal nerve impingnment vs popliteal tenonitis. - XR KNEE LT MIN 4 V; Future --> neg for any arthritis changes   - methylPREDNISolone (MEDROL DOSEPACK) 4 mg tablet; Take as directed on package  Dispense: 1 Dose Pack; Refill: 0  - REFERRAL TO NEUROLOGY --> Will obtain nerve conduction study. Attempted to schedule study but clinic closed. Pt to call and schedule.      Pt was seen and discussed with Dr Shira José DO

## 2020-01-14 NOTE — PROGRESS NOTES
Chief Complaint   Patient presents with    Knee Pain     left ---sx started in october--pain radiates down leg     1. Have you been to the ER, urgent care clinic since your last visit? Hospitalized since your last visit? no    2. Have you seen or consulted any other health care providers outside of the [address] [place] [address] since your last visit? Include any pap smears or colon screening.  no    Pain worsens when still--

## 2020-01-21 ENCOUNTER — TELEPHONE (OUTPATIENT)
Dept: NEUROLOGY | Age: 40
End: 2020-01-21

## 2020-01-21 NOTE — TELEPHONE ENCOUNTER
R/t call to patient. She is worried about doing the test and not getting any results. She was referred for EMG by PCP. She was scheduled for New Patient appt with Dr. Luciano Urrutia when she only needs EMG/Nerve Conduction. She believes this issue is coming from her back and is worried that only her knee will be looked at. She has been doing physical therapy with no relief. Wednesday, February 05, 2020 03:00 PM for nerve conduction study.       She did request procedure codes to discuss with insurance regarding cost of test.

## 2020-01-21 NOTE — TELEPHONE ENCOUNTER
----- Message from Rachna Cash sent at 1/21/2020  9:27 AM EST -----  Regarding: Dr. Jovan Wilson  Pt would like a call back regarding details of testing for nerve study.  Contact is [phone]

## 2020-02-05 ENCOUNTER — OFFICE VISIT (OUTPATIENT)
Dept: NEUROLOGY | Age: 40
End: 2020-02-05

## 2020-02-05 DIAGNOSIS — M79.662 PAIN IN LEFT LOWER LEG: ICD-10-CM

## 2020-02-05 DIAGNOSIS — G89.29 CHRONIC PAIN OF LEFT KNEE: Primary | ICD-10-CM

## 2020-02-05 DIAGNOSIS — M25.562 CHRONIC PAIN OF LEFT KNEE: Primary | ICD-10-CM

## 2020-02-05 NOTE — PROCEDURES
EMG/ NCS Report  [facility]  [address] [facility], [address] [place] [address] [address], [address]   Ph: [phone]   FAX: [phone]/ [phone]  Test Date:  2020    Patient: Shanae Roa : 1980 Physician: Hal Latif   Sex: Female  < Ref Phys: Matt Jane M.D. Patient History / Exam:  Patient is complaining of chronic left knee pain and lower leg pain. Exam is non-focal. Assess for neuropathy vs lumbar radiculopathy. EMG & NCV Findings:  Sensory and motor nerve conduction studies (as indicated in the tables) were within reference of normal.   All F Wave latencies were within normal limits. Disposable concentric needle EMG (as indicated in the table) was normal.     Impression: This study is normal. There is no electrodiagnostic evidence of an entrapment neuropathy, generalized neuropathy, myopathy or significant lumbar radiculopathy at this time. Recommend laboratory testing of inflammatory arthritis or connective tissue disorder (ESR, CRP, MOHAMUD and etc.). Thank you for the consult.     Anastacia Callahan M.D.    Nerve Conduction Studies  Anti Sensory Summary Table     Stim Site NR Peak (ms) Norm Peak (ms) P-T Amp (µV) Norm P-T Amp Site1 Site2 Dist (cm)   Left Sup Fibular Anti Sensory (Lat ankle)  30°C   Lower leg    2.3 <4.5 23.4 >5 Lower leg Lat ankle 10.0   Site 2    2.4  19.0       Left Sural Anti Sensory (Lat Mall)  30.5°C   Calf    3.5 <4.5 10.3 >4.0 Calf Lat Mall 14.0   Site 2    3.5  10.3         Motor Summary Table     Stim Site NR Onset (ms) Norm Onset (ms) O-P Amp (mV) Norm O-P Amp Amp (Prev) (%) Site1 Site2 Dist (cm) Wil (m/s) Norm Wil (m/s)   Left Fibular Motor (Ext Dig Brev)  29.4°C   Ankle    4.0 <6.5 4.1 >2.6 100.0 Ankle Ext Dig Brev 8.0     B Fib    10.3  4.1  100.0 B Fib Ankle 31.0 49 >38   Poplt    11.9  4.1  100.0 Poplt B Fib 10.0 63 >42   Left Tibial Motor (Abd Mitchell Brev)  29°C   Ankle    3.8 <6.1 13.6 >5.3 100.0 Ankle Abd Mitchell Brev 8.0     Knee    11.0  11.8  86.8 Knee Ankle 34.0 47 >39     F Wave Studies     NR F-Lat (ms) Lat Norm (ms) L-R F-Lat (ms) L-R Lat Norm   Left Tibial (Mrkrs) (Abd Hallucis)  28.8°C      52.43 <56  <5.7     H Reflex Studies     NR H-Lat (ms) L-R H-Lat (ms) L-R Lat Norm   Left Tibial (Gastroc)  28.7°C      31.48  <2.0     EMG     Side Muscle Nerve Root Ins Act Fibs Psw Recrt Duration Amp Poly Comment   Left VastusLat Femoral L2-4 Nml Nml Nml Nml Nml Nml Nml    Left MedGastroc Tibial S1-2 Nml Nml Nml Nml Nml Nml Nml    Left AntTibialis Dp Br Peron L4-5 Nml Nml Nml Nml Nml Nml Nml    Left Peroneus Long   Nml Nml Nml Nml Nml Nml Nml    Left BicepsFemS Sciatic L5-S1 Nml Nml Nml Nml Nml Nml Nml    Left TensorFascLat SupGluteal L4-5, S1 Nml Nml Nml Nml Nml Nml Nml    Left Lower Lumb Parasp Rami L5,S1 Nml Nml Nml Nml Nml Nml Nml    Left Mid Lumb Parasp Rami L4,5 Nml Nml Nml Nml Nml Nml Nml          Waveforms:

## 2020-02-06 ENCOUNTER — TELEPHONE (OUTPATIENT)
Dept: FAMILY MEDICINE CLINIC | Age: 40
End: 2020-02-06

## 2020-02-06 NOTE — TELEPHONE ENCOUNTER
Please call patient at her request. She wishes to discuss the nerve conduction study she had for her leg pain. Patient would like to know next steps as she is not getting any relief and feels more labs need to be done.       Asking to be contacted asap , call [phone]

## 2020-02-10 NOTE — TELEPHONE ENCOUNTER
She is asking for the doctor to call her today. [phone]    Patient called to say she has seen the neurologist.    She needs to discuss all of this. Her issue is just more painful everyday and she needs answers.

## 2020-02-18 ENCOUNTER — HOSPITAL ENCOUNTER (OUTPATIENT)
Dept: LAB | Age: 40
Discharge: HOME | End: 2020-02-18

## 2020-02-18 ENCOUNTER — OFFICE VISIT (OUTPATIENT)
Dept: FAMILY MEDICINE CLINIC | Age: 40
End: 2020-02-18

## 2020-02-18 VITALS
RESPIRATION RATE: 16 BRPM | OXYGEN SATURATION: 100 % | SYSTOLIC BLOOD PRESSURE: 106 MMHG | BODY MASS INDEX: 20.88 KG/M2 | WEIGHT: 133 LBS | TEMPERATURE: 98.2 F | HEIGHT: 67 IN | HEART RATE: 58 BPM | DIASTOLIC BLOOD PRESSURE: 66 MMHG

## 2020-02-18 DIAGNOSIS — M79.10 MYALGIA: ICD-10-CM

## 2020-02-18 DIAGNOSIS — M54.40 LOW BACK PAIN WITH SCIATICA, SCIATICA LATERALITY UNSPECIFIED, UNSPECIFIED BACK PAIN LATERALITY, UNSPECIFIED CHRONICITY: ICD-10-CM

## 2020-02-18 DIAGNOSIS — M25.50 ARTHRALGIA, UNSPECIFIED JOINT: ICD-10-CM

## 2020-02-18 DIAGNOSIS — M54.40 LOW BACK PAIN WITH SCIATICA, SCIATICA LATERALITY UNSPECIFIED, UNSPECIFIED BACK PAIN LATERALITY, UNSPECIFIED CHRONICITY: Primary | ICD-10-CM

## 2020-02-18 LAB
COMMENT, HOLDF: NORMAL
CRP SERPL HS-MCNC: 0.9 MG/L
SAMPLES BEING HELD,HOLD: NORMAL
TSH SERPL DL<=0.05 MIU/L-ACNC: 1.86 UIU/ML (ref 0.36–3.74)

## 2020-02-18 NOTE — PROGRESS NOTES
Chief Complaint   Patient presents with    Leg Pain     pt states left leg pain x 1 yr ago . seen by neurologist Dr. Kaylyn Singh     1. Have you been to the ER, urgent care clinic since your last visit? Hospitalized since your last visit? No    2. Have you seen or consulted any other health care providers outside of the [address] [place] [address] since your last visit? Include any pap smears or colon screening.  No

## 2020-02-18 NOTE — PROGRESS NOTES
Kristine Rose is a 35 y.o. male who presents for f/u low back and left leg pain. Sx for about 1.5 years. She was first seen in clinic 1 year ago for her low back pain. She went to PT and she had mild improvement but no resolution. Pain has increased since stopping PT. No f/u until she was seen by another physician 1 month ago for similar complaints. She describes burning tingling pain in the leg from the back down to the left foot. Pain worse with sitting. Better with standing but if she stands too long or walks for a long time then the pain worsens. She says it is hard to get into a comfortable position when sitting and standing b/c of the pain radiating down the left leg. She had radiographs of the left knee 1 month ago during her last visit that was normal.  She had a EMG/NCS of the lower legs on 2/5/2020 that was WNL. She has tried NSAIDs without relief. 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 No weakness. No change in gait. No fever or night sweats. No wgt change. No bowel or bladder incontinence. No saddle anesthesia. PMHx:  History reviewed. No pertinent past medical history. Meds:   Current Outpatient Medications   Medication Sig Dispense Refill    VITAMIN B COMPLEX (B-COMPLEX PO) Take  by mouth. Allergies:    Allergies   Allergen Reactions    Codeine Other (comments)     migraines       Smoker:  Social History     Tobacco Use   Smoking Status Never Smoker   Smokeless Tobacco Never Used       ETOH:   Social History     Substance and Sexual Activity   Alcohol Use No       FH:   Family History   Problem Relation Age of Onset    Breast Cancer Paternal Aunt 48        breast cancer       ROS:  Per HPI    Physical Exam:  Visit Vitals  /66 (BP 1 Location: Left arm, BP Patient Position: Sitting)   Pulse (!) 58   Temp 98.2 °F (36.8 °C) (Oral)   Resp 16   Ht 5' 7\" (1.702 m)   Wt 133 lb (60.3 kg)   SpO2 100%   BMI 20.83 kg/m²     MSK:    Posture: Normal   Deformity: None    ROM:     Flexion: Normal    Extension: Normal     Lateral bending: Normal      Gait: Normal       Palpation:    L1-L5: No tenderness    Sacrum: No tenderness    Coccyx: No tenderness    Left Paraspinal: No tenderness    Right Paraspinal: No tenderness     Strength (0-5/5)    Hip Flexion:   Left: 5/5  Right: 5/5    Hip Extension:  Left: 5/5  Right: 5/5    Hip Abduction:  Left: 5/5  Right: 5/5    Hip Adduction:  Left: 5/5  Right: 5/5    Knee Extension:  Left: 5/5  Right: 5/5    Knee Flexion:   Left: 5/5  Right: 5/5    Ankle dorsiflexion:  Left: 5/5  Right: 5/5    Ankle plantarflexion:  Left: 5/5  Right: 5/5    Great toe extension:  Left: 5/5  Right: 5/5     Sensation: Intact, no deficits      Special test:    Straight leg: Left: Pain with testing in the lower back and behind the knee  Right: Negative    Piriformis: Left: increased pain with testing but sx start at lower back and no pain in buttock  Right: Negative    Imaging: Radiographs of the lumbar spine personally reviewed and demonstrates no obvious fracture or dislocation. No significant degenerative changes present. Labs reviewed:  Component      Latest Ref Rng & Units 2/8/2019 2/8/2019 2/8/2019           3:58 PM  3:58 PM  3:58 PM   Glucose      65 - 99 mg/dL   105 (H)   BUN      6 - 20 mg/dL   15   Creatinine      0.57 - 1.00 mg/dL   0.66   GFR est non-AA      >59 mL/min/1.73   112   GFR est AA      >59 mL/min/1.73   129   BUN/Creatinine ratio      9 - 23   23   Sodium      134 - 144 mmol/L   144   Potassium      3.5 - 5.2 mmol/L   4.5   Chloride      96 - 106 mmol/L   104   CO2      20 - 29 mmol/L   26   Calcium      8.7 - 10.2 mg/dL   9.3   Protein, total      6.0 - 8.5 g/dL   7.3   Albumin      3.5 - 5.5 g/dL   4.6   GLOBULIN, TOTAL      1.5 - 4.5 g/dL   2.7   A-G Ratio      1.2 - 2.2   1.7   Bilirubin, total      0.0 - 1.2 mg/dL   0.2   Alk.  phosphatase      39 - 117 IU/L   54   AST      0 - 40 IU/L   17   ALT (SGPT) 0 - 32 IU/L   10   WBC      3.4 - 10.8 x10E3/uL  5.9    RBC      3.77 - 5.28 x10E6/uL  4.13    HGB      11.1 - 15.9 g/dL  12.0    HCT      34.0 - 46.6 %  37.8    MCV      79 - 97 fL  92    MCH      26.6 - 33.0 pg  29.1    MCHC      31.5 - 35.7 g/dL  31.7    RDW      12.3 - 15.4 %  12.8    PLATELET      897 - 307 x10E3/uL  339    Cholesterol, total      100 - 199 mg/dL 158     Triglyceride      0 - 149 mg/dL 66     HDL Cholesterol      >39 mg/dL 53     VLDL, calculated      5 - 40 mg/dL 13     LDL, calculated      0 - 99 mg/dL 92         Assessment:    ICD-10-CM ICD-9-CM    1. Low back pain with sciatica, sciatica laterality unspecified, unspecified back pain laterality, unspecified chronicity M54.40 724.3 XR SPINE LUMB 2 OR 3 V      RHEUMATOID FACTOR, QL      SED RATE (ESR)      CRP, HIGH SENSITIVITY      LUPUS PROFILE      VITAMIN D, 25 HYDROXY   2. Myalgia M79.10 729.1 RHEUMATOID FACTOR, QL      SED RATE (ESR)      CRP, HIGH SENSITIVITY      LUPUS PROFILE      VITAMIN D, 25 HYDROXY   3. Arthralgia, unspecified joint M25.50 719.40 RHEUMATOID FACTOR, QL      SED RATE (ESR)      CRP, HIGH SENSITIVITY      LUPUS PROFILE      VITAMIN D, 25 HYDROXY   Uncertain etiology of patient's low back pain and left leg pain. EMG/NCV and radiographs are WNL. PT did not offer resolution of her sx. Will check labs today for rheumatologic and autoimmune process. If these labs are normal, then will consider MRI for the lumbar spine. Piriformis syndrome could be a possibility if above w/u is normal.      Plan:  Labs today. Continue HEP. If labs WNL then will get MRI of the lumbar spine.     RTC: PRN

## 2020-02-19 NOTE — PROGRESS NOTES
Chief Complaint: full physical exam  Source: self     Subjective:   Julio Cesar Elmore is an 35 y.o. female who presents for complete physical exam.     Has chronic low back, left leg and knee pain, sees Dr Chris Tucker. Started 1.5years ago. 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 Subsequently has had shooting pains in left leg s/p trigger pont injection and has done PT as well Pain is worse when sitting. Followed by Dr Angie Nair (obgyn) for PAP and mammogram    Doing well. 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 No current facility-administered medications for this visit.           Past Medical History - reviewed:  Past Medical History:   Diagnosis Date    Anemia          Past Surgical History - reviewed:  Past Surgical History:   Procedure Laterality Date    HX GYN      ovarian cyst         Family History - reviewed:  Family History   Problem Relation Age of Onset    Breast Cancer Paternal Aunt 48        breast cancer    Osteoporosis Paternal Grandmother     Hypertension Maternal Grandmother     Diabetes Maternal Grandfather          Social History - reviewed:  Social History     Socioeconomic History    Marital status:      Spouse name: Not on file    Number of children: Not on file    Years of education: Not on file    Highest education level: Not on file   Occupational History    Not on file   Social Needs    Financial resource strain: Not on file    Food insecurity:     Worry: Not on file     Inability: Not on file    Transportation needs:     Medical: Not on file     Non-medical: Not on file   Tobacco Use    Smoking status: Never Smoker    Smokeless tobacco: Never Used   Substance and Sexual Activity    Alcohol use: No    Drug use: Never    Sexual activity: Yes   Lifestyle    Physical activity:     Days per week: Not on file     Minutes per session: Not on file    Stress: Not on file   Relationships    Social connections:     Talks on phone: Not on file     Gets together: Not on file     Attends Mu-ism service: Not on file     Active member of club or organization: Not on file     Attends meetings of clubs or organizations: Not on file     Relationship status: Not on file    Intimate partner violence:     Fear of current or ex partner: Not on file     Emotionally abused: Not on file     Physically abused: Not on file     Forced sexual activity: Not on file   Other Topics Concern    Not on file   Social History Narrative    Works cleaning houses         Immunizations - reviewed:   Immunization History   Administered Date(s) Administered    Tdap 02/08/2019     Flu: none   Tdap: UTD  Pneumovax: Not indicated  Zostervax: Not indicated       Health Maintenance reviewed -  Colonoscopy Never done, no family history  DEXA scan Not Indicated   HIV testing will done   Hepatitis C testing Not Indicated   Lung cancer screening Not Indicated       Review of Systems   Constitutional: Negative for chills and fever. HENT: Negative for congestion and ear discharge. Eyes: Negative for blurred vision and double vision. Respiratory: Negative for cough and sputum production. Cardiovascular: Negative for chest pain and palpitations. Gastrointestinal: Negative for abdominal pain, constipation, diarrhea, nausea and vomiting. Genitourinary: Negative for dysuria, frequency and urgency. Musculoskeletal: Positive for back pain and joint pain. Negative for myalgias and neck pain. Skin: Negative for itching and rash. Neurological: Negative for dizziness and headaches. Psychiatric/Behavioral: Negative for depression. The patient is not nervous/anxious.             Objective:     Visit Vitals  BP 97/57 (BP 1 Location: Left arm, BP Patient Position: Sitting)   Pulse 62   Temp 98.4 °F (36.9 °C) (Oral)   Resp 16   Ht 5' 7\" (1.702 m)   Wt 131 lb 3.2 oz (59.5 kg)   SpO2 99%   BMI 20.55 kg/m²       Physical Exam  Constitutional:       Appearance: He is normal weight. HENT:      Right Ear: Tympanic membrane, ear canal and external ear normal. There is no impacted cerumen. Left Ear: Tympanic membrane, ear canal and external ear normal. There is no impacted cerumen. Nose: Nose normal. No congestion. Mouth/Throat:      Mouth: Mucous membranes are moist.      Pharynx: No oropharyngeal exudate or posterior oropharyngeal erythema. Eyes:      General:         Right eye: No discharge. Left eye: No discharge. Pupils: Pupils are equal, round, and reactive to light. Neck:      Musculoskeletal: Normal range of motion and neck supple. No neck rigidity. Cardiovascular:      Rate and Rhythm: Normal rate and regular rhythm. Pulses: Normal pulses. Heart sounds: No murmur. Pulmonary:      Effort: Pulmonary effort is normal. No respiratory distress. Breath sounds: Normal breath sounds. No wheezing or rales. Abdominal:      General: Abdomen is flat. Bowel sounds are normal.      Palpations: Abdomen is soft. Musculoskeletal: Normal range of motion. General: No swelling or deformity. Right lower leg: No edema. Left lower leg: No edema. Lymphadenopathy:      Cervical: No cervical adenopathy. Skin:     General: Skin is warm and dry. Capillary Refill: Capillary refill takes less than 2 seconds. Neurological:      General: No focal deficit present. Mental Status: He is alert and oriented to person, place, and time. Psychiatric:         Mood and Affect: Mood normal.         Behavior: Behavior normal.           Assessment:       ICD-10-CM ICD-9-CM    1.  Wellness examination Z00.00 V70.0 LIPID PANEL      HIV 1/2 AG/AB, 4TH GENERATION,W RFLX CONFIRM      HEMOGLOBIN A1C WITH EAG   2. Anemia, unspecified type D64.9 285.9        Mildly decreased HgB on recent labs of 11.8 with hx of anemia in pregnancy. Discussed adding iron to MVI which patient takes daily. Otherwise, doing well without significant complaint other than left leg and knee pain which is being worked up by Dr Josiane Zamarripa who she sees regularly. Plan:   1. Wellness examination  - LIPID PANEL; Future  - HIV 1/2 AG/AB, 4TH GENERATION,W RFLX CONFIRM; Future  - HEMOGLOBIN A1C WITH EAG; Future    2. Anemia, unspecified type  - c/w MVI, add low dose iron      · Counseled re: diet, exercise, healthy lifestyle    · Appropriate labs, vaccines, imaging studies, and referrals ordered as listed above    · Discussed the patient's BMI with her. The BMI follow up plan is as follows: BMI at goal.    Follow-up and Dispositions    · Return in about 1 year (around 2/20/2021) for complete physical exam.       I have discussed the diagnosis with the patient and the intended plan as seen in the above orders. The patient has received an after-visit summary and questions were answered concerning future plans. I have discussed medication side effects and warnings with the patient as well. Informed pt to return to the office if new symptoms arise.     Patient discussed with Dr. William Junior, supervising physician    Matheus Casanova PGY2  Family Medicine Resident

## 2020-02-20 ENCOUNTER — OFFICE VISIT (OUTPATIENT)
Dept: FAMILY MEDICINE CLINIC | Age: 40
End: 2020-02-20

## 2020-02-20 ENCOUNTER — HOSPITAL ENCOUNTER (OUTPATIENT)
Dept: LAB | Age: 40
Discharge: HOME | End: 2020-02-20

## 2020-02-20 VITALS
SYSTOLIC BLOOD PRESSURE: 97 MMHG | RESPIRATION RATE: 16 BRPM | DIASTOLIC BLOOD PRESSURE: 57 MMHG | HEIGHT: 67 IN | TEMPERATURE: 98.4 F | BODY MASS INDEX: 20.59 KG/M2 | OXYGEN SATURATION: 99 % | HEART RATE: 62 BPM | WEIGHT: 131.2 LBS

## 2020-02-20 DIAGNOSIS — Z00.00 WELLNESS EXAMINATION: Primary | ICD-10-CM

## 2020-02-20 DIAGNOSIS — D64.9 ANEMIA, UNSPECIFIED TYPE: ICD-10-CM

## 2020-02-20 DIAGNOSIS — Z00.00 WELLNESS EXAMINATION: ICD-10-CM

## 2020-02-20 PROBLEM — M54.50 LOW BACK PAIN: Status: ACTIVE | Noted: 2020-02-20

## 2020-02-20 LAB
ALBUMIN SERPL-MCNC: 4.4 G/DL (ref 3.5–5)
ALBUMIN/GLOB SERPL: 1.5 {RATIO} (ref 1.1–2.2)
ALP SERPL-CCNC: 57 U/L (ref 45–117)
ALT SERPL-CCNC: 16 U/L (ref 12–78)
ANION GAP SERPL CALC-SCNC: 5 MMOL/L (ref 5–15)
AST SERPL-CCNC: 15 U/L (ref 15–37)
BILIRUB SERPL-MCNC: 0.5 MG/DL (ref 0.2–1)
BUN SERPL-MCNC: 9 MG/DL (ref 6–20)
BUN/CREAT SERPL: 14 (ref 12–20)
CALCIUM SERPL-MCNC: 9 MG/DL (ref 8.5–10.1)
CHLORIDE SERPL-SCNC: 104 MMOL/L (ref 97–108)
CHOLEST SERPL-MCNC: 156 MG/DL
CO2 SERPL-SCNC: 29 MMOL/L (ref 21–32)
CREAT SERPL-MCNC: 0.63 MG/DL (ref 0.55–1.02)
ERYTHROCYTE [DISTWIDTH] IN BLOOD BY AUTOMATED COUNT: 12.1 % (ref 11.5–14.5)
ERYTHROCYTE [SEDIMENTATION RATE] IN BLOOD: 7 MM/HR (ref 0–20)
EST. AVERAGE GLUCOSE BLD GHB EST-MCNC: 117 MG/DL
GLOBULIN SER CALC-MCNC: 3 G/DL (ref 2–4)
GLUCOSE SERPL-MCNC: 80 MG/DL (ref 65–100)
HBA1C MFR BLD: 5.7 % (ref 4–5.6)
HCT VFR BLD AUTO: 38 % (ref 35–47)
HDLC SERPL-MCNC: 60 MG/DL
HDLC SERPL: 2.6 {RATIO} (ref 0–5)
HGB BLD-MCNC: 11.8 G/DL (ref 11.5–16)
HIV 1+2 AB+HIV1 P24 AG SERPL QL IA: NONREACTIVE
HIV12 RESULT COMMENT, HHIVC: NORMAL
LDLC SERPL CALC-MCNC: 87.2 MG/DL (ref 0–100)
LIPID PROFILE,FLP: NORMAL
MCH RBC QN AUTO: 29.6 PG (ref 26–34)
MCHC RBC AUTO-ENTMCNC: 31.1 G/DL (ref 30–36.5)
MCV RBC AUTO: 95.2 FL (ref 80–99)
NRBC # BLD: 0 K/UL (ref 0–0.01)
NRBC BLD-RTO: 0 PER 100 WBC
PLATELET # BLD AUTO: 306 K/UL (ref 150–400)
PMV BLD AUTO: 9.6 FL (ref 8.9–12.9)
POTASSIUM SERPL-SCNC: 3.8 MMOL/L (ref 3.5–5.1)
PROT SERPL-MCNC: 7.4 G/DL (ref 6.4–8.2)
RBC # BLD AUTO: 3.99 M/UL (ref 3.8–5.2)
SODIUM SERPL-SCNC: 138 MMOL/L (ref 136–145)
TRIGL SERPL-MCNC: 44 MG/DL (ref ?–150)
VLDLC SERPL CALC-MCNC: 8.8 MG/DL
WBC # BLD AUTO: 3.9 K/UL (ref 3.6–11)

## 2020-02-20 RX ORDER — BISMUTH SUBSALICYLATE 262 MG
1 TABLET,CHEWABLE ORAL DAILY
COMMUNITY

## 2020-02-20 NOTE — PATIENT INSTRUCTIONS
Well Visit, Ages 25 to 48: Care Instructions  Your Care Instructions    Physical exams can help you stay healthy. Your doctor has checked your overall health and may have suggested ways to take good care of yourself. He or she also may have recommended tests. At home, you can help prevent illness with healthy eating, regular exercise, and other steps. Follow-up care is a key part of your treatment and safety. Be sure to make and go to all appointments, and call your doctor if you are having problems. It's also a good idea to know your test results and keep a list of the medicines you take. How can you care for yourself at home? · Reach and stay at a healthy weight. This will lower your risk for many problems, such as obesity, diabetes, heart disease, and high blood pressure. · Get at least 30 minutes of physical activity on most days of the week. Walking is a good choice. You also may want to do other activities, such as running, swimming, cycling, or playing tennis or team sports. Discuss any changes in your exercise program with your doctor. · Do not smoke or allow others to smoke around you. If you need help quitting, talk to your doctor about stop-smoking programs and medicines. These can increase your chances of quitting for good. · Talk to your doctor about whether you have any risk factors for sexually transmitted infections (STIs). Having one sex partner (who does not have STIs and does not have sex with anyone else) is a good way to avoid these infections. · Use birth control if you do not want to have children at this time. Talk with your doctor about the choices available and what might be best for you. · Protect your skin from too much sun. When you're outdoors from 10 a.m. to 4 p.m., stay in the shade or cover up with clothing and a hat with a wide brim. Wear sunglasses that block UV rays. Even when it's cloudy, put broad-spectrum sunscreen (SPF 30 or higher) on any exposed skin.   · See a dentist one or two times a year for checkups and to have your teeth cleaned. · Wear a seat belt in the car. Follow your doctor's advice about when to have certain tests. These tests can spot problems early. For everyone  · Cholesterol. Have the fat (cholesterol) in your blood tested after age 21. Your doctor will tell you how often to have this done based on your age, family history, or other things that can increase your risk for heart disease. · Blood pressure. Have your blood pressure checked during a routine doctor visit. Your doctor will tell you how often to check your blood pressure based on your age, your blood pressure results, and other factors. · Vision. Talk with your doctor about how often to have a glaucoma test.  · Diabetes. Ask your doctor whether you should have tests for diabetes. · Colon cancer. Your risk for colorectal cancer gets higher as you get older. Some experts say that adults should start regular screening at age 48 and stop at age 76. Others say to start before age 48 or continue after age 76. Talk with your doctor about your risk and when to start and stop screening. For women  · Breast exam and mammogram. Talk to your doctor about when you should have a clinical breast exam and a mammogram. Medical experts differ on whether and how often women under 50 should have these tests. Your doctor can help you decide what is right for you. · Cervical cancer screening test and pelvic exam. Begin with a Pap test at age 24. The test often is part of a pelvic exam. Starting at age 27, you may choose to have a Pap test, an HPV test, or both tests at the same time (called co-testing). Talk with your doctor about how often to have testing. · Tests for sexually transmitted infections (STIs). Ask whether you should have tests for STIs. You may be at risk if you have sex with more than one person, especially if your partners do not wear condoms.   For men  · Tests for sexually transmitted infections (STIs). Ask whether you should have tests for STIs. You may be at risk if you have sex with more than one person, especially if you do not wear a condom. · Testicular cancer exam. Ask your doctor whether you should check your testicles regularly. · Prostate exam. Talk to your doctor about whether you should have a blood test (called a PSA test) for prostate cancer. Experts differ on whether and when men should have this test. Some experts suggest it if you are older than 39 and are -American or have a father or brother who got prostate cancer when he was younger than 72. When should you call for help? Watch closely for changes in your health, and be sure to contact your doctor if you have any problems or symptoms that concern you. Where can you learn more? Go to http://lila-jalen.info/. Enter P072 in the search box to learn more about \"Well Visit, Ages 25 to 48: Care Instructions. \"  Current as of: December 13, 2018  Content Version: 12.2  © 8711-2451 [employer]. Care instructions adapted under license by [employer] (which disclaims liability or warranty for this information). If you have questions about a medical condition or this instruction, always ask your healthcare professional. [place] [address] any warranty or liability for your use of this information.

## 2020-02-20 NOTE — PROGRESS NOTES
No chief complaint on file. 1. Have you been to the ER, urgent care clinic since your last visit? Hospitalized since your last visit? No    2. Have you seen or consulted any other health care providers outside of the [address] [place] [address] since your last visit? Include any pap smears or colon screening.  OB/GYN [facility] Dr. Mayra Abarca

## 2020-02-21 LAB
25(OH)D3 SERPL-MCNC: 21.5 NG/ML (ref 30–100)
RHEUMATOID FACT SERPL-ACNC: <10 IU/ML

## 2020-02-24 ENCOUNTER — TELEPHONE (OUTPATIENT)
Dept: FAMILY MEDICINE CLINIC | Age: 40
End: 2020-02-24

## 2020-02-24 DIAGNOSIS — M25.50 ARTHRALGIA, UNSPECIFIED JOINT: ICD-10-CM

## 2020-02-24 DIAGNOSIS — R76.8 POSITIVE ANA (ANTINUCLEAR ANTIBODY): ICD-10-CM

## 2020-02-24 DIAGNOSIS — M79.10 MYALGIA: Primary | ICD-10-CM

## 2020-02-24 LAB
Lab: NORMAL
REFERENCE LAB,REFLB: NORMAL
TEST DESCRIPTION:,ATST: NORMAL

## 2020-02-24 NOTE — PROGRESS NOTES
Labs showed positive MOHAMUD but all the other inflammatory test, autoantibody test and other labs (blood counts, kidney, liver, thyroid) were all normal.  Likely false positive test but  I'm referring her to rheumatology for further evaluation.

## 2020-02-24 NOTE — TELEPHONE ENCOUNTER
----- Message from Emy Lennon sent at 2/24/2020 10:45 AM EST -----  Regarding: Dr. Anthony Orr  General Message/Vendor Calls    Caller's first and last name:Nae Esparza      Reason for call: lab results      Callback required yes/no and why:yes      Best contact number(s):[phone]      Details to clarify the request:Patient is trying to get her lab results please call her      Emy Lennon

## 2020-02-25 ENCOUNTER — TELEPHONE (OUTPATIENT)
Dept: FAMILY MEDICINE CLINIC | Age: 40
End: 2020-02-25

## 2020-02-25 RX ORDER — ERGOCALCIFEROL 1.25 MG/1
50000 CAPSULE ORAL
Qty: 8 CAP | Refills: 0 | Status: SHIPPED | OUTPATIENT
Start: 2020-02-25 | End: 2020-04-15

## 2020-02-25 NOTE — PROGRESS NOTES
Vit D level is low. Will start Ergocalciferol 50,000 units weekly for 8 weeks and then recheck in 3 months.

## 2020-02-25 NOTE — TELEPHONE ENCOUNTER
Patient asking that MRI of her lower back be ordered as she states she can't live like this anymore.  She has found a place that her insurance will cover and that she will only have to pay $200.00    Name of [facility] /  [phone]  Fax [phone]      thanks

## 2020-02-25 NOTE — TELEPHONE ENCOUNTER
[facility]  for patient to return call        ----- Message from Carina Hernandez MD sent at 2/24/2020 12:22 PM EST -----  Please tell patient that one autoantibody test was positive but all the other inflammatory test, autoantibody test and other labs (blood counts, kidney, liver, thyroid) were all normal.  I'm referring her to a rheumatologist for further evaluation. Please schedule with Rheum and let patient know. Thanks.

## 2020-02-26 DIAGNOSIS — M79.605 PAIN OF LEFT LOWER EXTREMITY: Primary | ICD-10-CM

## 2020-03-02 ENCOUNTER — TELEPHONE (OUTPATIENT)
Dept: FAMILY MEDICINE CLINIC | Age: 40
End: 2020-03-02

## 2020-03-02 DIAGNOSIS — M54.40 LOW BACK PAIN WITH SCIATICA, SCIATICA LATERALITY UNSPECIFIED, UNSPECIFIED BACK PAIN LATERALITY, UNSPECIFIED CHRONICITY: Primary | ICD-10-CM

## 2020-03-02 NOTE — TELEPHONE ENCOUNTER
----- Message from Viola Reddy sent at 3/2/2020  2:11 PM EST -----  Regarding: Dr. Nicholas Round General Message/Vendor Calls    Caller's first and last name: Harmeet Lovelace      Reason for call: Regarding her MRI results.        Call back required yes/no and why: Yes       Best contact number(s): [phone]      Details to clarify the request:      Viola Reddy

## 2020-03-04 NOTE — TELEPHONE ENCOUNTER
Patient returning call to nurse Roma Benedict. Ask that you confirm if MRI results received from [address] [place] [address]. Patient states she spoke with [address] [place] [address] and this was faxed to [phone] on Monday. She states if you don't have, then you must contact them directly to request and have sent to another fax number that you mentioned to her on today.       Name of place /  [address] [place] [address] [phone]  Fax [phone]

## 2020-03-05 NOTE — TELEPHONE ENCOUNTER
Pt notified message below per Dr. Juno Ngo. Appt scheduled for March 17th with [address] [place] [address]  Dr. Rishabh Cedillo an states will follow up with rheumatology      ----- Message from Yan Jones MD sent at 3/5/2020  1:01 PM EST -----  MRI shows mild degenerative changes and small disc bulge. Please refer to Ortho for further evaluation. I would also recommend she follow up with Rheumatology as previously recommended. Thanks.

## 2020-05-19 ENCOUNTER — OFFICE VISIT (OUTPATIENT)
Dept: RHEUMATOLOGY | Age: 40
End: 2020-05-19

## 2020-05-19 VITALS
HEIGHT: 67 IN | TEMPERATURE: 98.3 F | SYSTOLIC BLOOD PRESSURE: 97 MMHG | BODY MASS INDEX: 19.78 KG/M2 | RESPIRATION RATE: 18 BRPM | DIASTOLIC BLOOD PRESSURE: 63 MMHG | HEART RATE: 79 BPM | WEIGHT: 126 LBS

## 2020-05-19 DIAGNOSIS — M77.01 MEDIAL EPICONDYLITIS OF ELBOW, RIGHT: ICD-10-CM

## 2020-05-19 DIAGNOSIS — M70.62 TROCHANTERIC BURSITIS OF LEFT HIP: ICD-10-CM

## 2020-05-19 DIAGNOSIS — G57.02 PIRIFORMIS SYNDROME OF LEFT SIDE: Primary | ICD-10-CM

## 2020-05-19 DIAGNOSIS — M76.32 ILIOTIBIAL BAND SYNDROME, LEFT: ICD-10-CM

## 2020-05-19 DIAGNOSIS — M65.831 EXTENSOR TENOSYNOVITIS OF RIGHT WRIST: ICD-10-CM

## 2020-05-19 DIAGNOSIS — R76.8 FALSE POSITIVE ANA: ICD-10-CM

## 2020-05-19 NOTE — LETTER
5/19/20 Patient: Jovita Rodrigues YOB: 1980 Date of Visit: 5/19/2020 Gabriel Crawford MD 
[address] [place] [id number] VIA In Basket Dear Gabriel Crawford MD, Thank you for referring Ms. Demian Early to [address] [place] [address] for evaluation. My notes for this consultation are attached. If you have questions, please do not hesitate to call me. I look forward to following your patient along with you.  
 
 
Sincerely, 
 
Alycia Raya MD

## 2020-05-19 NOTE — PATIENT INSTRUCTIONS
Piriformis Syndrome: Care Instructions  Your Care Instructions    The piriformis muscle is deep under your rear end (buttock). One end of the muscle connects deep inside the pelvic area, and the other end attaches to the top of the thighbone. This muscle can press on the sciatic nerve that runs from your spine down your leg. When this happens, you may have pain, numbness, and tingling in the buttock and down the back of your leg. This is called piriformis syndrome. The pain may get worse when you sit for a long time or climb stairs. Also, you may be more likely to develop piriformis syndrome if you run or walk often. Your doctor will check for other causes of your pain before treating this syndrome. Treatment may include stretching exercises, massage, and medicine for the pain and swelling. If these do not help, you may get a shot of steroid medicine. Until the pain is gone, you may need to rest the muscle and limit activities like running. Exercises and a change in how you move and sit may be enough to stop the pressure on the nerve. Follow-up care is a key part of your treatment and safety. Be sure to make and go to all appointments, and call your doctor if you are having problems. It's also a good idea to know your test results and keep a list of the medicines you take. How can you care for yourself at home? · If your doctor thinks that strenuous exercise is causing your problem, stop or cut back on activities such as running. You may find swimming to be a good exercise for a while. · Stretch the piriformis muscle. ? Lie on your back. ? Bend one leg at the knee and keep the other leg flat on the ground. ? Raise your bent knee up and then move it across your body. Hold the outside of the knee with the opposite hand. ? Gently pull the knee with your hand toward the opposite shoulder. ? Hold the stretch for at least 15 to 30 seconds. Switch legs. ? Do the stretch several times each day.   · Massage the muscle to relieve pressure. ? Sit on the floor. Lean to one side so that the hip on your sore side is off the ground. Put a tennis ball under your buttock on that side. ? As you put weight onto the tennis ball, you may find spots that are especially sore. Move gently so that the tennis ball gently massages each of the sore spots. · Use ice or heat to help reduce pain. Put ice or a cold pack or a heating pad set on low or a warm cloth on the sore area for 10 to 20 minutes at a time. Put a thin cloth between the ice pack or heating pad and your skin. · Ask your doctor if you can take an over-the-counter pain medicine, such as acetaminophen (Tylenol), ibuprofen (Advil, Motrin), or naproxen (Aleve). Be safe with medicines. Read and follow all instructions on the label. · Have your doctor or a physical therapist watch how you move. You may need physical therapy or special inserts in your shoes (orthotics) to help you move in a way that does not put pressure on your nerves. When should you call for help? Watch closely for changes in your health, and be sure to contact your doctor if:    · You do not feel better after several weeks of home care.     · Your pain gets worse.     · Your leg becomes weak or numb. Where can you learn more? Go to http://lila-jalen.info/  Enter C901 in the search box to learn more about \"Piriformis Syndrome: Care Instructions. \"  Current as of: June 26, 2019Content Version: 12.4  © 7434-2016 Healthwise, Incorporated. Care instructions adapted under license by [employer] (which disclaims liability or warranty for this information). If you have questions about a medical condition or this instruction, always ask your healthcare professional. [place] [address] any warranty or liability for your use of this information. Piriformis Syndrome: Exercises  Introduction  Here are some examples of exercises for you to try.  The exercises may be suggested for a condition or for rehabilitation. Start each exercise slowly. Ease off the exercises if you start to have pain. You will be told when to start these exercises and which ones will work best for you. How to do the exercises  Hip rotator stretch   1. Lie on your back with both knees bent and your feet flat on the floor. 2. Put the ankle of your affected leg on your opposite thigh near your knee. 3. Use your hand to gently push your knee (on your affected leg) away from your body until you feel a gentle stretch around your hip. 4. Hold the stretch for 15 to 30 seconds. 5. Repeat 2 to 4 times. 6. Switch legs and repeat steps 1 through 5. Piriformis stretch   1. Lie on your back with your legs straight. 2. Lift your affected leg and bend your knee. With your opposite hand, reach across your body, and then gently pull your knee toward your opposite shoulder. 3. Hold the stretch for 15 to 30 seconds. 4. Repeat with your other leg. 5. Repeat 2 to 4 times on each side. Lower abdominal strengthening   1. Lie on your back with your knees bent and your feet flat on the floor. 2. Tighten your belly muscles by pulling your belly button in toward your spine. 3. Lift one foot off the floor and bring your knee toward your chest, so that your knee is straight above your hip and your leg is bent like the letter \"L. \"  4. Lift the other knee up to the same position. 5. Lower one leg at a time to the starting position. 6. Keep alternating legs until you have lifted each leg 8 to 12 times. 7. Be sure to keep your belly muscles tight and your back still as you are moving your legs. Be sure to breathe normally. Follow-up care is a key part of your treatment and safety. Be sure to make and go to all appointments, and call your doctor if you are having problems. It's also a good idea to know your test results and keep a list of the medicines you take.   Current as of: June 26, 2019Content Version: 12.4  © 8950-9562 Healthwise, [employer]. Care instructions adapted under license by [employer] (which disclaims liability or warranty for this information). If you have questions about a medical condition or this instruction, always ask your healthcare professional. [address] any warranty or liability for your use of this information. Hip Bursitis: Care Instructions  Your Care Instructions    Bursitis is inflammation of the bursa. A bursa is a small sac of fluid that cushions a joint and helps it move easily. A bursa sits between a bone in the hip and the muscles and tendons in the thigh and buttock. Injury or overuse of the hip can cause bursitis. Activities that can lead to bursitis include twisting and rapid joint movement. Bursitis can cause hip pain. Bursitis usually gets better if you avoid the activity that caused it. If pain lasts or gets worse despite home treatment, your doctor may draw fluid from the bursa through a needle. This may relieve your pain and help your doctor know if you have an infection. If so, your doctor will prescribe antibiotics. If you have inflammation only, you may get a corticosteroid shot to reduce swelling and pain. Sometimes surgery is needed to drain or remove the bursa. Follow-up care is a key part of your treatment and safety. Be sure to make and go to all appointments, and call your doctor if you are having problems. It's also a good idea to know your test results and keep a list of the medicines you take. How can you care for yourself at home? · Put ice or a cold pack on your hip for 10 to 20 minutes at a time. Put a thin cloth between the ice and your skin. · After 3 days of using ice, you may use heat on your hip. You can use a hot water bottle, a heating pad set on low, or a warm, moist towel. · Rest your hip. Stop any activities that cause pain. Switch to activities that do not stress your hip.   · Take your medicines exactly as prescribed. Call your doctor if you think you are having a problem with your medicine. · Ask your doctor if you can take an over-the-counter pain medicine, such as acetaminophen (Tylenol), ibuprofen (Advil, Motrin), or naproxen (Aleve). Be safe with medicines. Read and follow all instructions on the label. · To prevent stiffness, gently move the hip joint as much as you can without pain every day. As the pain gets better, keep doing range-of-motion exercises. Ask your doctor for exercises that will make the muscles around the hip joint stronger. Do these as directed. · You can slowly return to the activity that caused the pain, but do it with less effort until you can do it without pain or swelling. Be sure to warm up before and stretch after you do the activity. When should you call for help? Call your doctor now or seek immediate medical care if:    · You have a fever.     · You have increased swelling or redness in your hip.     · You cannot use your hip, or the pain in your hip gets worse.    Watch closely for changes in your health, and be sure to contact your doctor if:    · You have pain for 2 weeks or longer despite home treatment. Where can you learn more? Go to http://lila-jalen.info/  Enter I156 in the search box to learn more about \"Hip Bursitis: Care Instructions. \"  Current as of: June 26, 2019Content Version: 12.4  © 5579-0051 Healthwise, Incorporated. Care instructions adapted under license by [employer] (which disclaims liability or warranty for this information). If you have questions about a medical condition or this instruction, always ask your healthcare professional. [address] any warranty or liability for your use of this information. Trochanteric Bursitis: Exercises  Introduction  Here are some examples of exercises for you to try. The exercises may be suggested for a condition or for rehabilitation.  Start each exercise slowly. Ease off the exercises if you start to have pain. You will be told when to start these exercises and which ones will work best for you. How to do the exercises  Hamstring wall stretch   1. Lie on your back in a doorway, with your good leg through the open door. 2. Slide your affected leg up the wall to straighten your knee. You should feel a gentle stretch down the back of your leg. 1. Do not arch your back. 2. Do not bend either knee. 3. Keep one heel touching the floor and the other heel touching the wall. Do not point your toes. 3. Hold the stretch for at least 1 minute to begin. Then try to lengthen the time you hold the stretch to as long as 6 minutes. 4. Repeat 2 to 4 times. 5. If you do not have a place to do this exercise in a doorway, there is another way to do it:  6. Lie on your back, and bend the knee of your affected leg. 7. Loop a towel under the ball and toes of that foot, and hold the ends of the towel in your hands. 8. Straighten your knee, and slowly pull back on the towel. You should feel a gentle stretch down the back of your leg. 9. Hold the stretch for 15 to 30 seconds. Or even better, hold the stretch for 1 minute if you can. 10. Repeat 2 to 4 times. Straight-leg raises to the outside   1. Lie on your side, with your affected leg on top. 2. Tighten the front thigh muscles of your top leg to keep your knee straight. 3. Keep your hip and your leg straight in line with the rest of your body, and keep your knee pointing forward. Do not drop your hip back. 4. Lift your top leg straight up toward the ceiling, about 12 inches off the floor. Hold for about 6 seconds, then slowly lower your leg. 5. Repeat 8 to 12 times. Clamshell   1. Lie on your side, with your affected leg on top and your head propped on a pillow. Keep your feet and knees together and your knees bent. 2. Raise your top knee, but keep your feet together. Do not let your hips roll back. Your legs should open up like a clamshell. 3. Hold for 6 seconds. 4. Slowly lower your knee back down. Rest for 10 seconds. 5. Repeat 8 to 12 times. Standing quadriceps stretch   1. If you are not steady on your feet, hold on to a chair, counter, or wall. You can also lie on your stomach or your side to do this exercise. 2. Bend the knee of the leg you want to stretch, and reach behind you to grab the front of your foot or ankle with the hand on the same side. For example, if you are stretching your right leg, use your right hand. 3. Keeping your knees next to each other, pull your foot toward your buttock until you feel a gentle stretch across the front of your hip and down the front of your thigh. Your knee should be pointed directly to the ground, and not out to the side. 4. Hold the stretch for 15 to 30 seconds. 5. Repeat 2 to 4 times. Piriformis stretch   1. Lie on your back with your legs straight. 2. Lift your affected leg and bend your knee. With your opposite hand, reach across your body, and then gently pull your knee toward your opposite shoulder. 3. Hold the stretch for 15 to 30 seconds. 4. Repeat 2 to 4 times. Double knee-to-chest   1. Lie on your back with your knees bent and your feet flat on the floor. You can put a small pillow under your head and neck if it is more comfortable. 2. Bring both knees to your chest.  3. Keep your lower back pressed to the floor. Hold for 15 to 30 seconds. 4. Relax, and lower your knees to the starting position. 5. Repeat 2 to 4 times. Follow-up care is a key part of your treatment and safety. Be sure to make and go to all appointments, and call your doctor if you are having problems. It's also a good idea to know your test results and keep a list of the medicines you take. Where can you learn more? Go to http://lila-jalen.info/  Enter N503 in the search box to learn more about \"Trochanteric Bursitis: Exercises. \"  Current as of: June 26, 2019Content Version: 12.4  © 3883-4425 Healthwise, [employer]. Care instructions adapted under license by [employer] (which disclaims liability or warranty for this information). If you have questions about a medical condition or this instruction, always ask your healthcare professional. [address] any warranty or liability for your use of this information. Iliotibial Band Syndrome: Care Instructions  Your Care Instructions  Iliotibial band syndrome is pain and swelling of the iliotibial band (also called the IT band). This is a band of tissue that runs down the outside of your thigh. It connects the side of your hip to the side of your knee. It helps keep your knee and hip stable and in their normal position. When you have IT band syndrome, you may feel pain on the outside of your hip. It happens as your IT band snaps back and forth over the bony point of your hip. Sometimes you may only feel pain on the outside of your knee. You can get this syndrome if the IT band is too tight or if you do certain activities over and over that put pressure on your hip or knee. This is a common problem in runners, cyclists, and people who do other aerobic activities. IT band syndrome is treated with rest and medicines. These relieve swelling and pain. Physical therapy is also used. It may include stretching or doing certain exercises that can help strengthen your IT band and hip muscles. Sometimes a steroid shot is given to help relieve pain at the spot that is most sore. Follow-up care is a key part of your treatment and safety. Be sure to make and go to all appointments, and call your doctor if you are having problems. It's also a good idea to know your test results and keep a list of the medicines you take. How can you care for yourself at home? · Stay at a healthy weight. Being overweight puts extra strain on your hip and knee joints.   · Take pain medicines exactly as directed. ? If the doctor gave you a prescription medicine for pain, take it as prescribed. ? If you are not taking a prescription pain medicine, ask your doctor if you can take an over-the-counter medicine. · Talk to your doctor or physical therapist about exercises that will help ease hip and knee pain. ? Stretch before you exercise. This can help prevent stiffness and injury. You can try gentle forms of yoga to help keep your joints and muscles flexible. ? Use exercises that are less stressful on the joints. Walk instead of jog. Ride a stationary bike with little resistance. Or you can swim or try water exercise. ? Do exercises that can help strengthen your IT band and hip muscles. Your doctor or physical therapist can tell you what kind of exercises are best for you. He or she can help you learn the right way to do the exercises. When should you call for help? Watch closely for changes in your health, and be sure to contact your doctor if:    · You have pain in your hip or knee that doesn't go away.     · You do not get better as expected. Where can you learn more? Go to http://lila-jalen.info/  Enter L449 in the search box to learn more about \"Iliotibial Band Syndrome: Care Instructions. \"  Current as of: June 26, 2019Content Version: 12.4  © 0619-6195 Healthwise, Incorporated. Care instructions adapted under license by [employer] (which disclaims liability or warranty for this information). If you have questions about a medical condition or this instruction, always ask your healthcare professional. [place] [address] any warranty or liability for your use of this information. Iliotibial Band Syndrome: Exercises  Introduction  Here are some examples of exercises for you to try. The exercises may be suggested for a condition or for rehabilitation. Start each exercise slowly. Ease off the exercises if you start to have pain.   You will be told when to start these exercises and which ones will work best for you. How to do the exercises  Iliotibial band stretch   1. Lean sideways against a wall. If you are not steady on your feet, hold on to a chair or counter. 2. Stand on the leg with the affected hip, with that leg close to the wall. Then cross your other leg in front of it. 3. Let your affected hip drop out to the side of your body and against the wall. Then lean away from your affected hip until you feel a stretch. 4. Hold the stretch for 15 to 30 seconds. 5. Repeat 2 to 4 times. Piriformis stretch   1. Lie on your back with your legs straight. 2. Lift your affected leg and bend your knee. With your opposite hand, reach across your body, and then gently pull your knee toward your opposite shoulder. 3. Hold the stretch for 15 to 30 seconds. 4. Repeat 2 to 4 times. Hamstring wall stretch   1. Lie on your back in a doorway, with your good leg through the open door. 2. Slide your affected leg up the wall to straighten your knee. You should feel a gentle stretch down the back of your leg. 1. Do not arch your back. 2. Do not bend either knee. 3. Keep one heel touching the floor and the other heel touching the wall. Do not point your toes. 3. Hold the stretch for at least 1 minute to begin. Then try to lengthen the time you hold the stretch to as long as 6 minutes. 4. Repeat 2 to 4 times. 5. If you do not have a place to do this exercise in a doorway, there is another way to do it:  6. Lie on your back, and bend the knee of your affected leg. 7. Loop a towel under the ball and toes of that foot, and hold the ends of the towel in your hands. 8. Straighten your knee, and slowly pull back on the towel. You should feel a gentle stretch down the back of your leg. 9. Hold the stretch for 15 to 30 seconds. Or even better, hold the stretch for 1 minute if you can. 10. Repeat 2 to 4 times.     Follow-up care is a key part of your treatment and safety. Be sure to make and go to all appointments, and call your doctor if you are having problems. It's also a good idea to know your test results and keep a list of the medicines you take. Where can you learn more? Go to http://lila-jalen.info/  Enter P252 in the search box to learn more about \"Iliotibial Band Syndrome: Exercises. \"  Current as of: June 26, 2019Content Version: 12.4  © 5811-1952 Healthwise, Incorporated. Care instructions adapted under license by [employer] (which disclaims liability or warranty for this information). If you have questions about a medical condition or this instruction, always ask your healthcare professional. [address] any warranty or liability for your use of this information. Tennis Elbow: Care Instructions  Your Care Instructions    Tennis elbow is soreness or pain on the outer part of the elbow. The pain occurs when the tendon is stretched and becomes irritated by repeated twisting of the hand, wrist, and forearm. A tendon is a tough tissue that connects muscle to bone. This injury is common in tennis players. But you also can get it from many activities that work the same muscles. Examples include gardening, painting, and using tools. Tennis elbow usually heals with rest and treatment at home. Follow-up care is a key part of your treatment and safety. Be sure to make and go to all appointments, and call your doctor if you are having problems. It's also a good idea to know your test results and keep a list of the medicines you take. How can you care for yourself at home?    · Rest your fingers, wrist, and forearm. Try to stop or reduce any activity that causes elbow pain. You may have to rest your arm for weeks to months. Follow your doctor's directions for how long to rest.     · Put ice or a cold pack on your elbow for 10 to 20 minutes at a time.  Try to do this every 1 to 2 hours for the next 3 days (when you are awake) or until the swelling goes down. Put a thin cloth between the ice and your skin. You can try heat, or alternating heat and ice, after the first 3 days.     · If your doctor gave you a brace or splint, use it as directed. A \"counterforce\" brace is a strap around your forearm, just below your elbow. It may ease the pressure on the tendon and spread force throughout your arm.     · Prop up your elbow on pillows to help reduce swelling.     · Follow your doctor's or physical therapist's directions for exercise.     · Return to your usual activities slowly.     · Try to prevent the problem. Learn the best techniques for your sport. For example, make sure the  on your tennis racquet is not too big for your hand. Try not to hit a tennis ball late in your swing.     · Think about asking your employer about new ways of doing your job if your elbow pain is caused by something you do at work. Medicines    · Be safe with medicines. Read and follow all instructions on the label. ? If the doctor gave you a prescription medicine for pain, take it as prescribed. ? If you are not taking a prescription pain medicine, ask your doctor if you can take an over-the-counter medicine. When should you call for help? Call your doctor now or seek immediate medical care if:    · Your pain is worse.     · You cannot bend your elbow normally.     · Your arm or hand is cool or pale or changes color.     · You have tingling, weakness, or numbness in your hand and fingers.    Watch closely for changes in your health, and be sure to contact your doctor if:    · You have work problems caused by your elbow pain.     · Your pain is not better after 2 weeks. Where can you learn more? Go to http://lila-jalen.info/  Enter C285 in the search box to learn more about \"Tennis Elbow: Care Instructions. \"  Current as of: June 26, 2019Content Version: 12.4  © 6919-2371 Healthwise, Incorporated. Care instructions adapted under license by [email or web address] (which disclaims liability or warranty for this information). If you have questions about a medical condition or this instruction, always ask your healthcare professional. [address] any warranty or liability for your use of this information. Tennis Elbow: Exercises  Introduction  Here are some examples of exercises for you to try. The exercises may be suggested for a condition or for rehabilitation. Start each exercise slowly. Ease off the exercises if you start to have pain. You will be told when to start these exercises and which ones will work best for you. How to do the exercises  Wrist flexor stretch   1. Extend your arm in front of you with your palm up. 2. Bend your wrist, pointing your hand toward the floor. 3. With your other hand, gently bend your wrist farther until you feel a mild to moderate stretch in your forearm. 4. Hold for at least 15 to 30 seconds. Repeat 2 to 4 times. Wrist extensor stretch   1. Repeat steps 1 to 4 of the stretch above but begin with your extended hand palm down. Ball or sock squeeze   1. Hold a tennis ball (or a rolled-up sock) in your hand. 2. Make a fist around the ball (or sock) and squeeze. 3. Hold for about 6 seconds, and then relax for up to 10 seconds. 4. Repeat 8 to 12 times. 5. Switch the ball (or sock) to your other hand and do 8 to 12 times. Wrist deviation   1. Sit so that your arm is supported but your hand hangs off the edge of a flat surface, such as a table. 2. Hold your hand out like you are shaking hands with someone. 3. Move your hand up and down. 4. Repeat this motion 8 to 12 times. 5. Switch arms. 6. Try to do this exercise twice with each hand. Wrist curls   1. Place your forearm on a table with your hand hanging over the edge of the table, palm up. 2. Place a 1- to 2-pound weight in your hand.  This may be a dumbbell, a can of food, or a filled water bottle. 3. Slowly raise and lower the weight while keeping your forearm on the table and your palm facing up. 4. Repeat this motion 8 to 12 times. 5. Switch arms, and do steps 1 through 4.  6. Repeat with your hand facing down toward the floor. Switch arms. Biceps curls   1. Sit leaning forward with your legs slightly spread and your left hand on your left thigh. 2. Place your right elbow on your right thigh, and hold the weight with your forearm horizontal.  3. Slowly curl the weight up and toward your chest.  4. Repeat this motion 8 to 12 times. 5. Switch arms, and do steps 1 through 4. Follow-up care is a key part of your treatment and safety. Be sure to make and go to all appointments, and call your doctor if you are having problems. It's also a good idea to know your test results and keep a list of the medicines you take. Where can you learn more? Go to http://lila-jalen.info/  Enter Z964 in the search box to learn more about \"Tennis Elbow: Exercises. \"  Current as of: June 26, 2019Content Version: 12.4  © 2371-5542 [employer], Incorporated. Care instructions adapted under license by [employer] (which disclaims liability or warranty for this information). If you have questions about a medical condition or this instruction, always ask your healthcare professional. [address] any warranty or liability for your use of this information.

## 2020-05-19 NOTE — PROGRESS NOTES
REASON FOR VISIT    This is the initial evaluation for Ms. Maldonado Franks a 35 y.o.  female for question of an inflammatory arthritis. The patient is referred to the [facility] at the request of Dr. Ashia Euceda. HISTORY OF PRESENT ILLNESS      I have reviewed and summarized old records from [facility] ([address] [place] [address])    In 2018, she developed intermittent aching pain in her joints. In 2/05/2020, EMG/NCS showed This study is normal. There is no electrodiagnostic evidence of an entrapment neuropathy, generalized neuropathy, myopathy or significant lumbar radiculopathy at this time    In 2/18/2020, labs showed WBC 3.9, Hct 38.0%, platelets 999,405, creatinine 0.63 mg/dL, eGFR >60, albumin 4.4 g/dL, ALK 57 U/L, ALT 16 U/L, AST 15 U/L, ESR 7 mm/hr, hsCRP 0.9 mg/L, vitamin D 21.5, positive MOHAMUD direct, negative anti-dsDNA Ab, anti-Sm, anti-RNP, rheumatoid factor, HIV. Lumbar Spine radiograph showed no fracture or subluxation. The disc spaces are preserved. The soft tissues are unremarkable. Note is made of an intrauterine device in place. Today, she complains of tender in her right hand associated with swelling. She works as a  so uses her hand a lot. She notes that her right 3rd MCP is tender and swollen. She is left handed but notes that she has a hard time grasping with her right side. She denies pain or stiffness in her hands in the morningbut notes some weakness when making a fist. She feels more aching pain in her diffusely (shoulders, knees) (left side more than right) when it is cold or it is raining. NSAIDs do not help. At times, she has left arm numbness if she lays on. She had done physical therapy in her lower back without relief and actually causes aggravated pain in her lower back pain. She has left sided shooting pain that radiates down her foot and may be associated tingling.     Therapy History includes:  Current DMARD therapy includes: none  Prior DMARD therapy includes: none  The following DMARDs have been ineffective: none  The following DMARDs were stopped because of side effects: none    REVIEW OF SYSTEMS    A 15 point review of systems was performed and summarized below. The questionnaire was reviewed with the patient and scanned into the patient's medical record.     General: denies recent weight gain, recent weight loss, fatigue, weakness, fever, drenching night sweats  Musculoskeletal: endorses joint pain, joint swelling, muscle pain, denies morning stiffness  Ears: denies ringing in ears, hearing loss, deafness  Eyes: denies pain, light sensitive, redness, blindness, double vision, blurred vision, excess tearing, dryness, foreign body sensation  Mouth: denies sore tongue, oral ulcers, loss of taste, dryness, increased dental caries  Nose: denies nosebleeds, nasal ulcers  Throat: denies food stuck when swallowing, difficulty with swallowing, hoarseness, pain in jaw while chewing  Neck: denies swollen glands, tender glands  Cardiopulmonary: denies pain in chest with deep breaths, pain in chest when lying down, murmurs, sudden changes in heart beat, wheezing, dry cough, productive cough, shortness of breath at rest, shortness of breath on exertion, coughing of blood  Gastrointestinal: denies nausea, heartburn, stomach pain relieved by food, chronic constipation, chronic diarrhea, blood in stools, black stools  Genitourinary: denies vaginal dryness, pain or burning on urination, blood in urine, cloudy urine, vaginal ulcers   Hematologic: denies anemia, bleeding tendency, blood clots, bleeding gums  Skin: denies easy bruising, hair loss, rash, rash worsened after sun exposure, hives/urticaria, skin thickening, skin tightness, nodules/bumps, color changes of hands or feet in the cold (Raynaud's)  Neurologic: endorses numbness or tingling in feet, denies numbness or tingling in hands, muscle weakness  Psychiatric: denies depression, excessive worries, PTSD, Bipolar    PAST MEDICAL HISTORY    She has a past medical history of Anemia, Pre-diabetes, and Vitamin D deficiency. FAMILY HISTORY    Her family history includes Breast Cancer (age of onset: 48) in her paternal aunt; Diabetes in her maternal grandfather and paternal grandfather; Hypertension in her maternal grandmother; No Known Problems in her father and mother; Osteoporosis in her paternal grandmother. SOCIAL HISTORY    She reports that she has never smoked. She has never used smokeless tobacco. She reports that she does not drink alcohol or use drugs. GYNECOLOGIC HISTORY     1, Para 1, Living 1, Miscarriage 0    She denies severe pre-eclampsia, eclampsia or placental insufficiency    HEALTH MAINTENANCE    Immunizations  Immunization History   Administered Date(s) Administered    Tdap 2019       Age Appropriate Cancer Screening    PAP Smear: 2019  Mammogram: 2017    MEDICATIONS    Current Outpatient Medications   Medication Sig Dispense Refill    ferrous sulfate (Iron) 325 mg (65 mg iron) cpER Take  by mouth.  Lactobacillus acidophilus (PROBIOTIC PO) Take  by mouth daily.  multivitamin (ONE A DAY) tablet Take 1 Tab by mouth daily. ALLERGIES    Allergies   Allergen Reactions    Codeine Other (comments)     migraines       PHYSICAL EXAMINATION    Visit Vitals  BP 97/63   Pulse 79   Temp 98.3 °F (36.8 °C)   Resp 18   Ht 5' 7\" (1.702 m)   Wt 126 lb (57.2 kg)   BMI 19.73 kg/m²     Body mass index is 19.73 kg/m². General: Patient is alert, oriented x 3, not in acute distress    HEENT:   Conjunctiva are not injected and appear moist, oral mucous membranes are moist, there are no ulcers present, there is no alopecia, neck is supple, there is no lymphadenopathy. Salivary glands are normal    Cardiovascular:  Heart is regular rate and rhythm, no murmurs. Chest:  Lungs are clear to auscultation bilaterally.     Extremities:  Free of clubbing, cyanosis, edema, extremities well perfused. Neurological exam:  Muscle strength is full in upper and lower extremities. Skin exam:  There are no rashes, no tophi, no psoriasis, no active Raynaud's, no livedo reticularis, no periungual erythema. Musculoskeletal exam:  A comprehensive musculoskeletal exam was performed for all joints of each upper and lower extremity and assessed for swelling, tenderness and range of motion. Pertinent results are documented as below:    Tenderness along the right Common extensor tendons to the insertion of the 2nd and 3rd MCPs with tenderness along the A1 pulley and clicking of the 2nd and 3rd MCP  Left trochanteric bursa tenderness  Left distal iliotibial band tenderness to insertion  Left gluteal tenderness    No synovitis    DATA REVIEW    Prior medical records were reviewed and are summarized as below:    Laboratory data: summarized in the HPI    Imaging: summarized in the HPI. ASSESSMENT AND PLAN    1) Left Piriformis Syndrome. I gave her exercises and recommended that she be evaluated by physical therapy if no improvement. 2) Left Trochnateric Bursitis. I gave her exercises and recommended that she be evaluated by physical therapy if no improvement. 3) Left Iliotibial Band Syndrome. I gave her exercises and recommended that she be evaluated by physical therapy if no improvement. 4) Right Medial Epicondylitis with Extensor Tenosynovitis. This is from over use. I recommended a tennis elbow strap and I gave her exercises and recommended that she be evaluated by physical therapy if no improvement. 5) Right Stenosing Tenosynovitis. This is from vocational over use. If no improvement, she needs to see hand orthopedics. 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 Likely false positive. The patient voiced understanding of the aforementioned assessment and plan. Summary of plan was provided in the After Visit Summary patient instructions.  I also provided education about MyChart setup and utility. TODAY'S ORDERS    None    No future appointments.     Viral Tran MD, [facility]    Adult Rheumatology   Rheumatology Ultrasound Certified  [address], [address]   Phone [phone]  Fax [phone]

## 2020-06-08 ENCOUNTER — DOCUMENTATION ONLY (OUTPATIENT)
Dept: FAMILY MEDICINE CLINIC | Age: 40
End: 2020-06-08

## 2020-06-08 NOTE — PROGRESS NOTES
2/28/2020 MRI Lumbar Spine c/w 3.5mm central disc protrusion at L5-S1. Performed at outside facility.     Full report placed in scan

## 2020-06-17 DIAGNOSIS — M79.605 PAIN OF LEFT LOWER EXTREMITY: ICD-10-CM

## 2020-09-18 ENCOUNTER — TELEPHONE (OUTPATIENT)
Dept: FAMILY MEDICINE CLINIC | Age: 40
End: 2020-09-18

## 2020-09-18 NOTE — TELEPHONE ENCOUNTER
----- Message from Shawanda Villalpando sent at 9/18/2020  1:45 PM EDT -----  Regarding: Dr. Maria Alejandra Liao first and last name and relationship to patient (if not the patient):self  Best contact number:[phone]  Preferred date and time: today  Scheduled appointment date and time: n/a  Reason for appointment: left knee  Details to clarify the request: Left Knee is in horrible pain and she would like to be seen today so she doesn't have to go the weekend with it hurting she thinks it might be a blood clot it is getting worse and worse, doesn't want to have to go to ER

## 2020-09-18 NOTE — TELEPHONE ENCOUNTER
Spoke with patient who was advised urgent care/[facility] or one of her choice.

## 2020-10-01 ENCOUNTER — OFFICE VISIT (OUTPATIENT)
Dept: FAMILY MEDICINE CLINIC | Age: 40
End: 2020-10-01
Payer: COMMERCIAL

## 2020-10-01 VITALS
DIASTOLIC BLOOD PRESSURE: 71 MMHG | HEIGHT: 67 IN | SYSTOLIC BLOOD PRESSURE: 107 MMHG | BODY MASS INDEX: 19.15 KG/M2 | TEMPERATURE: 97.7 F | RESPIRATION RATE: 17 BRPM | WEIGHT: 122 LBS | HEART RATE: 89 BPM | OXYGEN SATURATION: 100 %

## 2020-10-01 DIAGNOSIS — M54.40 LOW BACK PAIN WITH SCIATICA, SCIATICA LATERALITY UNSPECIFIED, UNSPECIFIED BACK PAIN LATERALITY, UNSPECIFIED CHRONICITY: Primary | ICD-10-CM

## 2020-10-01 PROCEDURE — 99213 OFFICE O/P EST LOW 20 MIN: CPT | Performed by: STUDENT IN AN ORGANIZED HEALTH CARE EDUCATION/TRAINING PROGRAM

## 2020-10-01 RX ORDER — DICLOFENAC SODIUM 75 MG/1
TABLET, DELAYED RELEASE ORAL
COMMUNITY
Start: 2020-09-09

## 2020-10-01 NOTE — PROGRESS NOTES
Subjective   Sim Hwang is an 35 y.o. female presents for follow up of low back pain and sciatica. Has been worsening for the last 2 years. Had negative EMG in 2/2020. MRI lumbar spine 2/28/20 with mild degeneration and 3.5mm central disc protrusion of L5-S1. Has had epidural steroid injections, PT, chiropracter, NSAIDs, ice/heat with only minimal relief. Has seen rheumatology, but is not planning to follow up as they did not have much to offer. Has appt with neurosurgeon, Dr. Tung Connors, on October 13th. Is taking diclofenac now which helps some. Extensive lab work revealing for only positive MOHAMUD, which likely is false positive; and low Vitamin D. Review of Systems   Review of Systems   Constitutional: Negative for chills and fever. HENT: Negative for congestion. Respiratory: Negative for cough and shortness of breath. Cardiovascular: Negative for chest pain, palpitations and leg swelling. Gastrointestinal: Negative for abdominal pain, nausea and vomiting. Musculoskeletal: Positive for back pain and joint pain (L knee). Skin: Negative for rash. Neurological: Positive for tingling (down L leg). Negative for dizziness, focal weakness and weakness. Allergies   Allergies   Allergen Reactions    Codeine Other (comments)     migraines       Medications  Current Outpatient Medications   Medication Sig    diclofenac EC (VOLTAREN) 75 mg EC tablet TAKE 1 TABLET BY MOUTH TWICE DAILY    Lactobacillus acidophilus (PROBIOTIC PO) Take  by mouth daily.  multivitamin (ONE A DAY) tablet Take 1 Tab by mouth daily.  ferrous sulfate (Iron) 325 mg (65 mg iron) cpER Take  by mouth. No current facility-administered medications for this visit.         Medical History  Past Medical History:   Diagnosis Date    Anemia     Pre-diabetes     Vitamin D deficiency        Immunizations   Immunization History   Administered Date(s) Administered    Tdap 02/08/2019       Objective   Vital Signs  Visit Vitals  /71 (BP 1 Location: Right arm, BP Patient Position: Sitting)   Pulse 89   Temp 97.7 °F (36.5 °C) (Temporal)   Resp 17   Ht 5' 7\" (1.702 m)   Wt 122 lb (55.3 kg)   SpO2 100%   BMI 19.11 kg/m²       Physical Examination  Physical Exam  Constitutional:       General: She is not in acute distress. Appearance: Normal appearance. She is normal weight. She is not ill-appearing or diaphoretic. HENT:      Head: Normocephalic and atraumatic. Eyes:      Conjunctiva/sclera: Conjunctivae normal.   Cardiovascular:      Rate and Rhythm: Normal rate and regular rhythm. Heart sounds: Normal heart sounds. No murmur. Pulmonary:      Effort: No respiratory distress. Breath sounds: Normal breath sounds. Musculoskeletal: Normal range of motion. General: Tenderness (mild tenderness in low back) present. No swelling, deformity or signs of injury. Right lower leg: No edema. Left lower leg: No edema. Skin:     General: Skin is warm. Findings: No rash. Neurological:      General: No focal deficit present. Mental Status: She is alert. Sensory: No sensory deficit. Motor: No weakness. Gait: Gait normal.          Assessment   Abdifatah Yoder is a 35 y.o. who presents for low back pain with sciatica. Has had extensive workup in the past and extensive therapy including most recently PT, diclofenac, and injections. Has appt with neurosurgery in 2 weeks. Plan   1. Low back pain with sciatica, sciatica laterality unspecified, unspecified back pain laterality, unspecified chronicity  -continue NSAIDs prn for pain, can use salonpas patch  -follow up with neurosurgery  -continue healthy diet      Follow-up and Dispositions    · Return if symptoms worsen or fail to improve. I have discussed the aforementioned diagnoses and plan with the patient in detail. I have provided information in person and/or in AVS. All questions answered prior to discharge.       I discussed this patient with Dr. Richelle Sanz (Attending Physician)   Signed By:  Vera Amor DO    Family Medicine Resident

## 2020-10-01 NOTE — PROGRESS NOTES
Chief Complaint   Patient presents with    Follow-up     leg pain and back pain-2 years--left leg     1. Have you been to the ER, urgent care clinic since your last visit? Hospitalized since your last visit? No    2. Have you seen or consulted any other health care providers outside of the [facility] since your last visit? Include any pap smears or colon screening.  No    Mild degeneration and slight herniation--L5 and S1    Neurosurgeon appt October 13th--consultation    Done 2 rounds of physical therapy--

## 2020-10-01 NOTE — PATIENT INSTRUCTIONS
Learning About High-Iron Foods What foods are high in iron? The foods you eat contain nutrients, such as vitamins and minerals. Iron is a nutrient. Your body needs the right amount to stay healthy and work as it should. You can use the list below to help you make choices about which foods to eat. Here are some foods that contain iron. They have 1 to 2 milligrams of iron per serving. Fruits · Figs (dried), 5 figs Vegetables · Asparagus (canned), 6 leger · Mariann, beet, Swiss chard, or turnip greens, 1 cup · Dried peas, cooked, ½ cup · Seaweed, spirulina (dried), ¼ cup · Spinach, (cooked) ½ cup or (raw) 1 cup Grains · Cereals, fortified with iron, 1 cup · Grits (instant, cooked), fortified with iron, ½ cup Meats and other protein foods · Beans (kidney, lima, navy, white), canned or cooked, ½ cup · Beef or lamb, 3 oz · Chicken giblets, 3 oz · Chickpeas (garbanzo beans), ½ cup · Liver of beef, lamb, or pork, 3 oz · Oysters (cooked), 3 oz · Sardines (canned), 3 oz · Soybeans (boiled), ½ cup · Tofu (firm), ½ cup Work with your doctor to find out how much of this nutrient you need. Depending on your health, you may need more or less of it in your diet. Current as of: August 22, 2019               Content Version: 12.6 © 9315-3737 [employer]. Care instructions adapted under license by [employer] (which disclaims liability or warranty for this information). If you have questions about a medical condition or this instruction, always ask your healthcare professional. [address] any warranty or liability for your use of this information.

## 2020-10-07 ENCOUNTER — PATIENT MESSAGE (OUTPATIENT)
Dept: NEUROLOGY | Age: 40
End: 2020-10-07

## 2022-03-18 PROBLEM — G57.02 PIRIFORMIS SYNDROME OF LEFT SIDE: Status: ACTIVE | Noted: 2020-05-19

## 2022-03-18 PROBLEM — M76.32 ILIOTIBIAL BAND SYNDROME, LEFT: Status: ACTIVE | Noted: 2020-05-19

## 2022-03-18 PROBLEM — M77.01 MEDIAL EPICONDYLITIS OF ELBOW, RIGHT: Status: ACTIVE | Noted: 2020-05-19

## 2022-03-19 PROBLEM — M70.62 TROCHANTERIC BURSITIS OF LEFT HIP: Status: ACTIVE | Noted: 2020-05-19

## 2022-03-19 PROBLEM — M65.831 EXTENSOR TENOSYNOVITIS OF RIGHT WRIST: Status: ACTIVE | Noted: 2020-05-19

## 2022-03-19 PROBLEM — M54.50 LOW BACK PAIN: Status: ACTIVE | Noted: 2020-02-20

## 2023-05-10 RX ORDER — DICLOFENAC SODIUM 75 MG/1
1 TABLET, DELAYED RELEASE ORAL 2 TIMES DAILY
COMMUNITY
Start: 2020-09-09

## 2024-11-08 NOTE — PROGRESS NOTES
HPI:  Coleen Rocha is a 43 y.o. female presenting for well woman exam.     Has IUD in place. Has regular follow up with OBGYN, has pap with GYN. No Menstrual cycle in several years. Sexually active?: yes with     Diet: good mix of carbohydrates, protein and fats    Exercise: not able to exercise right now due to back pain. Chronic low Back pain: Saw Dr. Hector Navas on 2/1/19 and had trigger point injection. Has tried ice, home exercises and aleve which has not relieved her back pain. Left lower back pain x 3 months. Works as a  so bending over a lot and this increases her pain. No bowel or bladder incontinence. No fever, night sweats, or weight changes. No saddle anesthesia. Allergies- reviewed: Allergies   Allergen Reactions    Codeine Other (comments)     migraines         Medications- reviewed:   Current Outpatient Medications   Medication Sig    cyclobenzaprine (FLEXERIL) 5 mg tablet Take 1 Tab by mouth nightly as needed for Muscle Spasm(s).  naproxen sodium (ALEVE) 220 mg tablet Take 220 mg by mouth two (2) times daily (with meals).  LORATADINE (CLARITIN PO) Take  by mouth.  MULTIVITAMIN PO Take  by mouth.  VITAMIN B COMPLEX (B-COMPLEX PO) Take  by mouth. No current facility-administered medications for this visit. Past Medical History- reviewed:  No past medical history on file.       Past Surgical History- reviewed:   Past Surgical History:   Procedure Laterality Date    HX GYN      ovarian cyst         Family History - reviewed:  Family History   Problem Relation Age of Onset    Breast Cancer Paternal Aunt 48        breast cancer         Social History - reviewed:  Social History     Socioeconomic History    Marital status:      Spouse name: Not on file    Number of children: Not on file    Years of education: Not on file    Highest education level: Not on file   Social Needs    Financial resource strain: Not on file   [place] Rec'd request from pharmacy requesting a 90 day supply of Crestor. Ok to give a 90 day supply of this medication as patient had a follow up with [facility] on 11/7/2024. New prescription sent to pharmacy.    insecurity - worry: Not on file    Food insecurity - inability: Not on file    Transportation needs - medical: Not on file   [employer] needs - non-medical: Not on file   Occupational History    Not on file   Tobacco Use    Smoking status: Never Smoker    Smokeless tobacco: Never Used   Substance and Sexual Activity    Alcohol use: No    Drug use: Not on file    Sexual activity: Not on file   Other Topics Concern    Not on file   Social History Narrative    Works cleaning houses       Immunizations - reviewed: There is no immunization history on file for this patient. Flu: declined.    Tdap: would like one today    Health Maintenance reviewed -  Pap smear with gyn    Review of Systems   CONSTITUTIONAL: Denies: fever, chills  EYES: Denies: blurry vision  ENT: Denies: sore throat, nasal congestion, nasal discharge  CARDIOVASCULAR: Denies: chest pain, dyspnea on exertion, edema  RESPIRATORY: Denies: cough, shortness of breath, wheezing  ENDOCRINE: Denies: polydipsia/polyuria  GI: Denies: abdominal pain, nausea, vomiting, diarrhea  : Denies: dysuria, frequency/urgency  NEURO: Denies: dizzy/vertigo, headache  MUSCULOSKELETAL: back pain  SKIN: Denies: rash, itching  PSYCH: Denies: anxiety, depression  BREASTS: No masses or nipple discharge      Physical Exam  Visit Vitals  /71   Pulse 69   Temp 97 °F (36.1 °C)   Resp 16   Ht 5' 7\" (1.702 m)   Wt 130 lb 9.6 oz (59.2 kg)   SpO2 99%   BMI 20.45 kg/m²       General appearance - alert, well appearing, and in no distress  Eyes - pupils equal and reactive, extraocular eye movements intact  Ears - bilateral TM's and external ear canals normal  Nose - normal and patent, no erythema, discharge or polyps  Mouth - mucous membranes moist, pharynx normal without lesions  Neck - supple, no significant adenopathy  Chest - clear to auscultation, no wheezes, rales or rhonchi, symmetric air entry  Heart - normal rate, regular rhythm, normal S1, S2, no murmurs, rubs, clicks or gallops  Abdomen - soft, nontender, nondistended, no masses or organomegaly  Neurological - alert, oriented, normal speech, no focal findings or movement disorder noted  Extremities - peripheral pulses normal, no pedal edema, no clubbing or cyanosis  Skin - normal coloration and turgor, no rashes, no suspicious skin lesions noted    MSK:   Posture: Normal  Deformity: None   ROM:               Flexion: Normal; pain in the left lower back with flexion. Extension: Normal; pain in the left lower back with flexion.                Lateral bending: Normal      Gait: Normal       Palpation:              L1-L5: No tenderness              Sacrum: No tenderness              Coccyx: No tenderness              Left Paraspinal: tenderness              Right Paraspinal: No tenderness     Strength (0-5/5)              Hip Flexion:                 Left: 5/5                       Right: 5/5              Hip Extension:             Left: 5/5                       Right: 5/5              Hip Abduction:             Left: 5/5                       Right: 5/5              Hip Adduction:            Left: 5/5                       Right: 5/5              Knee Extension:          Left: 5/5                       Right: 5/5              Knee Flexion:              Left: 5/5                       Right: 5/5              Ankle dorsiflexion:       Left: 5/5                       Right: 5/5              Ankle plantarflexion:   Left: 5/5                       Right: 5/5              Great toe extension:   Left: 5/5                       Right: 5/5     Sensation: Intact, no deficits      DTR:              Patella:            Left: +2                        Right: +2                Special test:              Straight leg:     Left: Negative              Right: Negative              Rubens:           Left: Positive               Right: Negative              Piriformis:        Left: Negative              Right: Negative      Assessment/Plan:    ICD-10-CM ICD-9-CM    1. Well adult exam Z00.00 V70.0 CBC W/O DIFF      METABOLIC PANEL, COMPREHENSIVE      LIPID PANEL      TETANUS, DIPHTHERIA TOXOIDS AND ACELLULAR PERTUSSIS VACCINE (TDAP), IN INDIVIDS. >=7, IM   2. Chronic left-sided low back pain without sciatica M54.5 724.2 REFERRAL TO PHYSICAL THERAPY    G89.29 338.29 cyclobenzaprine (FLEXERIL) 5 mg tablet     Well adult exam:   - Counseled re: diet, exercise, healthy lifestyle  - Appropriate labs, vaccines, imaging studies, and referrals ordered as listed above  - Discussed the patient's BMI with her. The BMI follow up plan is as follows: I have counseled this patient on diet and exercise regimens. Chronic L-sided low back pain without sciatica:   - Home Exercise Program as per handout.  - Ice 15 minutes, three times a day PRN and after exercise. Can alternate with heat for 15 minutes. - Naproxin (Aleve): 220mg 1-2 tablets twice a day PRN  - Referral to physical therapy  - Flexeril qhs prn.   - f/u after physical therapy if pain is not improved. I have discussed the diagnosis with the patient and the intended plan as seen in the above orders. The patient has received an after-visit summary and questions were answered concerning future plans. I have discussed medication side effects and warnings with the patient as well. Informed pt to return to the office if new symptoms arise. Jose A Mello MD  Family Medicine Resident    Patient seen and discussed with Dr. Yanely Bear, attending physician.